# Patient Record
Sex: MALE | Race: WHITE | NOT HISPANIC OR LATINO | Employment: OTHER | URBAN - METROPOLITAN AREA
[De-identification: names, ages, dates, MRNs, and addresses within clinical notes are randomized per-mention and may not be internally consistent; named-entity substitution may affect disease eponyms.]

---

## 2017-05-17 RX ORDER — TAMSULOSIN HYDROCHLORIDE 0.4 MG/1
CAPSULE ORAL
Qty: 90 CAPSULE | Refills: 1 | Status: SHIPPED | OUTPATIENT
Start: 2017-05-17 | End: 2017-06-23 | Stop reason: SDUPTHER

## 2017-06-06 RX ORDER — OXYBUTYNIN CHLORIDE 15 MG/1
TABLET, EXTENDED RELEASE ORAL
Qty: 30 TABLET | Refills: 11 | Status: SHIPPED | OUTPATIENT
Start: 2017-06-06 | End: 2017-06-23 | Stop reason: SDUPTHER

## 2017-06-06 RX ORDER — FINASTERIDE 5 MG/1
TABLET, FILM COATED ORAL
Qty: 90 TABLET | Refills: 1 | Status: SHIPPED | OUTPATIENT
Start: 2017-06-06 | End: 2017-06-23 | Stop reason: SDUPTHER

## 2017-06-23 ENCOUNTER — OFFICE VISIT (OUTPATIENT)
Dept: UROLOGY | Facility: CLINIC | Age: 67
End: 2017-06-23
Attending: UROLOGY
Payer: MEDICARE

## 2017-06-23 VITALS
DIASTOLIC BLOOD PRESSURE: 68 MMHG | WEIGHT: 256.5 LBS | HEIGHT: 72 IN | HEART RATE: 62 BPM | SYSTOLIC BLOOD PRESSURE: 125 MMHG | BODY MASS INDEX: 34.74 KG/M2

## 2017-06-23 DIAGNOSIS — N39.41 URGENCY INCONTINENCE: ICD-10-CM

## 2017-06-23 DIAGNOSIS — C64.9 RENAL CELL CARCINOMA, UNSPECIFIED LATERALITY: ICD-10-CM

## 2017-06-23 DIAGNOSIS — N40.0 BENIGN PROSTATIC HYPERPLASIA, PRESENCE OF LOWER URINARY TRACT SYMPTOMS UNSPECIFIED, UNSPECIFIED MORPHOLOGY: Primary | ICD-10-CM

## 2017-06-23 DIAGNOSIS — N40.0 ENLARGED PROSTATE: ICD-10-CM

## 2017-06-23 LAB — POC RESIDUAL URINE VOLUME: 59 ML (ref 0–100)

## 2017-06-23 PROCEDURE — 99214 OFFICE O/P EST MOD 30 MIN: CPT | Mod: S$PBB,,, | Performed by: UROLOGY

## 2017-06-23 PROCEDURE — 99213 OFFICE O/P EST LOW 20 MIN: CPT | Mod: PBBFAC | Performed by: UROLOGY

## 2017-06-23 PROCEDURE — 99999 PR PBB SHADOW E&M-EST. PATIENT-LVL III: CPT | Mod: PBBFAC,,, | Performed by: UROLOGY

## 2017-06-23 PROCEDURE — 51798 US URINE CAPACITY MEASURE: CPT | Mod: PBBFAC | Performed by: UROLOGY

## 2017-06-23 PROCEDURE — 1159F MED LIST DOCD IN RCRD: CPT | Mod: ,,, | Performed by: UROLOGY

## 2017-06-23 RX ORDER — TAMSULOSIN HYDROCHLORIDE 0.4 MG/1
1 CAPSULE ORAL DAILY
Qty: 90 CAPSULE | Refills: 3 | Status: SHIPPED | OUTPATIENT
Start: 2017-06-23 | End: 2017-11-25 | Stop reason: SDUPTHER

## 2017-06-23 RX ORDER — ERGOCALCIFEROL 1.25 MG/1
50000 CAPSULE ORAL
COMMUNITY

## 2017-06-23 RX ORDER — DIGOXIN 125 MCG
TABLET ORAL
COMMUNITY
Start: 2017-04-09

## 2017-06-23 RX ORDER — ALLOPURINOL 100 MG/1
TABLET ORAL
COMMUNITY
Start: 2017-05-20 | End: 2020-10-16 | Stop reason: SDUPTHER

## 2017-06-23 RX ORDER — PREDNISONE 10 MG/1
TABLET ORAL
COMMUNITY
Start: 2017-06-21 | End: 2018-06-05

## 2017-06-23 RX ORDER — SOTALOL HYDROCHLORIDE 80 MG/1
TABLET ORAL
COMMUNITY
Start: 2017-04-09

## 2017-06-23 RX ORDER — OXYBUTYNIN CHLORIDE 15 MG/1
15 TABLET, EXTENDED RELEASE ORAL DAILY
Qty: 90 TABLET | Refills: 3 | Status: SHIPPED | OUTPATIENT
Start: 2017-06-23 | End: 2018-06-05 | Stop reason: SDUPTHER

## 2017-06-23 RX ORDER — FINASTERIDE 5 MG/1
5 TABLET, FILM COATED ORAL DAILY
Qty: 90 TABLET | Refills: 11 | Status: SHIPPED | OUTPATIENT
Start: 2017-06-23 | End: 2017-12-11 | Stop reason: SDUPTHER

## 2017-06-23 RX ORDER — AMOXICILLIN AND CLAVULANATE POTASSIUM 875; 125 MG/1; MG/1
TABLET, FILM COATED ORAL
COMMUNITY
Start: 2017-06-13 | End: 2017-06-23

## 2017-06-23 NOTE — PROGRESS NOTES
Subjective:      Amish Cruz is a 66 y.o. male who returns today regarding his   Enlarged prostate and urge urinary incontinence    His symptoms are somewhat controlled with Flomax Proscar and Ditropan.  No side effects with medications reported.  He does have significant nocturia.    The following portions of the patient's history were reviewed and updated as appropriate: allergies, current medications, past family history, past medical history, past social history, past surgical history and problem list.    Review of Systems  Pertinent items are noted in HPI.  A comprehensive multipoint review of systems was negative except as otherwise stated in the HPI.     Objective:   Vitals: /68 (BP Location: Right arm, Patient Position: Sitting, BP Method: Automatic)   Pulse 62   Ht 6' (1.829 m)   Wt 116.3 kg (256 lb 8.1 oz)   BMI 34.79 kg/m²     Physical Exam   General: alert and oriented, no acute distress  Respiratory: Symmetric expansion, non-labored breathing  Cardiovascular: no peripheral edema  Abdomen: soft, non distended  Skin: normal coloration and turgor, no rashes, no suspicious skin lesions noted  Neuro: Lee gait and tremor consistent with Parkinson's  Psych: normal judgment and insight, normal mood/affect and non-anxious  Prostate 35-40 g no discrete nodules    Physical Exam    Lab Review   Urinalysis demonstrates negative for all components  No results found for: WBC, HGB, HCT, MCV, PLT  No results found for: CREATININE, BUN      PSA 5.17    Imaging  Post void residual 59 cc  Assessment and Plan:   Benign prostatic hyperplasia, presence of lower urinary tract symptoms unspecified, unspecified morphology  -     POCT URINE DIPSTICK WITH MICROSCOPE, AUTOMATED  -     POCT Bladder Scan    Renal cell carcinoma, unspecified laterality    Enlarged prostate    Urgency incontinence    Other orders  -     finasteride (PROSCAR) 5 mg tablet; Take 1 tablet (5 mg total) by mouth once daily.  Dispense: 90  tablet; Refill: 11  -     oxybutynin (DITROPAN XL) 15 MG TR24; Take 1 tablet (15 mg total) by mouth once daily.  Dispense: 90 tablet; Refill: 3  -     tamsulosin (FLOMAX) 0.4 mg Cp24; Take 1 capsule (0.4 mg total) by mouth once daily.  Dispense: 90 capsule; Refill: 3      We discussed his PSA level and age specific PSA ranges.  We discussed the risk and benefits of biopsy.  Given his comorbidities, I do not think prostate biopsy is necessary.  I would follow him with serial examinations.  Would consider not checking PSA in the future.  Return to clinic 1 year for repeat prostate examination and PVR check.  Follow-up with his primary physician Dr. Dallas

## 2017-08-23 DIAGNOSIS — G20.A1 PD (PARKINSON'S DISEASE): ICD-10-CM

## 2017-08-23 RX ORDER — CARBIDOPA, LEVODOPA AND ENTACAPONE 50; 200; 200 MG/1; MG/1; MG/1
1 TABLET, FILM COATED ORAL 4 TIMES DAILY
Qty: 360 TABLET | Refills: 3 | Status: SHIPPED | OUTPATIENT
Start: 2017-08-23 | End: 2018-09-17 | Stop reason: SDUPTHER

## 2017-08-23 NOTE — TELEPHONE ENCOUNTER
----- Message from Edgar Swan sent at 8/23/2017  1:12 PM CDT -----  Contact: Self @ [phone]  Pt would like refill for carbidopa-levodopa-entacapone (STALEVO) -200 mg per tablet    [facility] - [place] - [address]  [address] [place] [address]  Phone: [phone] Fax: [phone]

## 2017-11-27 RX ORDER — TAMSULOSIN HYDROCHLORIDE 0.4 MG/1
CAPSULE ORAL
Qty: 90 CAPSULE | Refills: 3 | Status: SHIPPED | OUTPATIENT
Start: 2017-11-27 | End: 2018-06-05 | Stop reason: SDUPTHER

## 2017-12-11 RX ORDER — FINASTERIDE 5 MG/1
TABLET, FILM COATED ORAL
Qty: 90 TABLET | Refills: 1 | Status: SHIPPED | OUTPATIENT
Start: 2017-12-11 | End: 2018-06-05 | Stop reason: SDUPTHER

## 2018-05-16 ENCOUNTER — TELEPHONE (OUTPATIENT)
Dept: UROLOGY | Facility: CLINIC | Age: 68
End: 2018-05-16

## 2018-05-16 NOTE — TELEPHONE ENCOUNTER
LM for pt that first available appt with Dr Rene is 6/5.  Appt made and appt slip mailed to patient

## 2018-05-16 NOTE — TELEPHONE ENCOUNTER
----- Message from Joy Gómez sent at 5/16/2018  1:15 PM CDT -----  Contact: CAROLYN MI [[id number]]            Name of Who is Calling: CAROLYN MI [[id number]]      What is the request in detail: Pt is calling to find out if he can be seen sooner than June due to increased PSA levels.  Pt says his PCP noticed the increase after having labs drawn about two weeks ago.  Please contact pt to further and advise.      Can the clinic reply by [facility]: No      What Number to Call Back [facility]: [phone]

## 2018-06-05 ENCOUNTER — OFFICE VISIT (OUTPATIENT)
Dept: UROLOGY | Facility: CLINIC | Age: 68
End: 2018-06-05
Attending: UROLOGY
Payer: MEDICARE

## 2018-06-05 VITALS
DIASTOLIC BLOOD PRESSURE: 75 MMHG | HEIGHT: 72 IN | WEIGHT: 256 LBS | BODY MASS INDEX: 34.67 KG/M2 | SYSTOLIC BLOOD PRESSURE: 112 MMHG | HEART RATE: 62 BPM

## 2018-06-05 DIAGNOSIS — N39.41 URGENCY INCONTINENCE: ICD-10-CM

## 2018-06-05 DIAGNOSIS — N40.0 ENLARGED PROSTATE: Primary | ICD-10-CM

## 2018-06-05 PROCEDURE — 99214 OFFICE O/P EST MOD 30 MIN: CPT | Mod: S$GLB,,, | Performed by: UROLOGY

## 2018-06-05 RX ORDER — AMLODIPINE BESYLATE 2.5 MG/1
TABLET ORAL
COMMUNITY
Start: 2018-06-04

## 2018-06-05 RX ORDER — OXYBUTYNIN CHLORIDE 15 MG/1
15 TABLET, EXTENDED RELEASE ORAL DAILY
Qty: 90 TABLET | Refills: 3 | Status: SHIPPED | OUTPATIENT
Start: 2018-06-05 | End: 2019-09-05 | Stop reason: SDUPTHER

## 2018-06-05 RX ORDER — DOCUSATE SODIUM 100 MG/1
100 CAPSULE, LIQUID FILLED ORAL 2 TIMES DAILY
COMMUNITY

## 2018-06-05 RX ORDER — FINASTERIDE 5 MG/1
5 TABLET, FILM COATED ORAL DAILY
Qty: 90 TABLET | Refills: 3 | Status: SHIPPED | OUTPATIENT
Start: 2018-06-05 | End: 2019-09-05 | Stop reason: SDUPTHER

## 2018-06-05 RX ORDER — POTASSIUM CHLORIDE 20 MEQ/1
TABLET, EXTENDED RELEASE ORAL
COMMUNITY
Start: 2018-05-17 | End: 2020-10-16 | Stop reason: SDUPTHER

## 2018-06-05 RX ORDER — BUMETANIDE 1 MG/1
TABLET ORAL
COMMUNITY
Start: 2018-05-17

## 2018-06-05 RX ORDER — TAMSULOSIN HYDROCHLORIDE 0.4 MG/1
1 CAPSULE ORAL DAILY
Qty: 90 CAPSULE | Refills: 3 | Status: SHIPPED | OUTPATIENT
Start: 2018-06-05 | End: 2019-09-05 | Stop reason: SDUPTHER

## 2018-09-17 DIAGNOSIS — G20.A1 PD (PARKINSON'S DISEASE): ICD-10-CM

## 2018-09-18 RX ORDER — CARBIDOPA, LEVODOPA AND ENTACAPONE 50; 200; 200 MG/1; MG/1; MG/1
TABLET, FILM COATED ORAL
Qty: 360 TABLET | Refills: 3 | Status: SHIPPED | OUTPATIENT
Start: 2018-09-18 | End: 2019-04-11 | Stop reason: SDUPTHER

## 2019-04-05 ENCOUNTER — OFFICE VISIT (OUTPATIENT)
Dept: NEUROLOGY | Facility: CLINIC | Age: 69
End: 2019-04-05
Payer: MEDICARE

## 2019-04-05 VITALS
HEIGHT: 73 IN | HEART RATE: 61 BPM | BODY MASS INDEX: 33.78 KG/M2 | SYSTOLIC BLOOD PRESSURE: 127 MMHG | DIASTOLIC BLOOD PRESSURE: 78 MMHG

## 2019-04-05 DIAGNOSIS — I48.20 CHRONIC ATRIAL FIBRILLATION: ICD-10-CM

## 2019-04-05 DIAGNOSIS — R26.89 PRIMARY FREEZING OF GAIT: ICD-10-CM

## 2019-04-05 DIAGNOSIS — G20.A1 PD (PARKINSON'S DISEASE): Primary | ICD-10-CM

## 2019-04-05 PROCEDURE — 99204 PR OFFICE/OUTPT VISIT, NEW, LEVL IV, 45-59 MIN: ICD-10-PCS | Mod: S$GLB,,, | Performed by: PSYCHIATRY & NEUROLOGY

## 2019-04-05 PROCEDURE — 99999 PR PBB SHADOW E&M-EST. PATIENT-LVL III: ICD-10-PCS | Mod: PBBFAC,,, | Performed by: PSYCHIATRY & NEUROLOGY

## 2019-04-05 PROCEDURE — 99204 OFFICE O/P NEW MOD 45 MIN: CPT | Mod: S$GLB,,, | Performed by: PSYCHIATRY & NEUROLOGY

## 2019-04-05 PROCEDURE — 99999 PR PBB SHADOW E&M-EST. PATIENT-LVL III: CPT | Mod: PBBFAC,,, | Performed by: PSYCHIATRY & NEUROLOGY

## 2019-04-05 NOTE — PROGRESS NOTES
"I. Chief Complaints during this visit:   Follow up for Parkinson's disease     History of present illness:   68 y.o. M seen in routine f/u for PD.  Not seen in four years.  Checked in 20min late.  Accompanied by daughter.  Says he had basically been ok, had trouble getting appointment.    Pain medication for muscle spasm caused significant problems of hallucinations.  Daughter says he was also taking more PD meds because family was more involved as well.  So they cut back on PD meds and his hallucinations improved, but they have had increase back up in last few months because he was freezing often.  No recurrence of hallucinations.    Has been living with daughter for past 5 years.  Can do all his own ADLs.  Occasionally confused at night.  Recent home health.      Interval history 11/3/15:  Accompanied by daughter.  Increase in stalevo helped his gait, but not his FOG and perhaps made him more tired.    From my note 8/3/15:  Now his 3 yo grand-daughter is living with them, though this has been a good thing for them.  Walking is "lowsy".  Freezing often.  No falls.  The legs are swelling up, again, and PCP hesitant to give diuretic because of low bp.    From my note 14:  Is more cognizant of wearing off or late with medications.  Freezing of legs at time and cannot step back.    Feelings of near-faint has much improved.  Wife  6 weeks ago after an elective surgery.      From my note 13:  3-4 months ago had change in behavior and doing strange things like walking out of house only in diaper.  During PSG, 02 dropped to 84% and became agitated.  Since adjusting the CPAP settings (up from 10 to 15), he no longer has the odd behavior.  Dozes off during day and will awake confused, but nothing like he was 3-4 months ago.  Patient says he recalls the episode with the diaper- that he woke up with a start out of sleep, ran out of the house and when he was outside, wondered "what the heck" he was out there " "for.  In terms of PD, he feels he has progressed.  Fallen x 2, putting hole in sheetrock.  More difficulty walking, drooling increasing, urinary urgency x 6 months.  Low back pain for years, no difference in this.    II. Review of systems As in HPI, otherwise, balance 3 systems reviewed and are negative.   III. Past Medical history: PD ~2004, HTN, obesity, arthritis   Family history: Father with PD, paternal aunt with "essential tremor"   Social history: No tob or etoh, , disabled; two daughters.      Current medications: stalevo 200 (6/12/10pm)   Allergies: Demerol, azilect (presyncope), mirapex (edema, weight)     IV. Physical Exam (focused)      Vitals:    04/05/19 1025   BP: 127/78   Pulse: 61   Height: 6' 1" (1.854 m)     General appearance: Well nourished, well developed, no acute distress.   ++head drop      Cardiovascular:  pedal pulses 2, no edema or cyanosis, heart regular rate and rhythym, no carotid bruits.         -------------------------------------------------------------  Facial Expression: normal       Affect: full       Orientation to time & place:  Oriented to time, place, person and situation       Attention & concentration:  Normal attention span and concentration       Memory:  Not tested, but gave all his own history  Language: Spontaneous, fluent; able to repeat and name objects        Fund of knowledge:  Aware of current events        Speech:  normal (not dysarthric)  -------------------------------------------------------  Cranial nerves: normal visual acuity, visual fields full, optic discs not well visualized due small pupils, pupils equal round and reactive, extraocular movements intact,       facial sensation intact, face symmetrical, hearing intact to whisper, palate raises midline, shoulder shrug strength normal, tongue protrudes midline.        -------------------------------------------------------  Musculoskeletal  Muscle tone: moderate tone BLE, mild RUE       Muscle Bulk: " all 4 extremities normal        Muscle strength:  5/5 in all 4 extremities        No pronator drift  Sensation: Intact to light touch in all extremities          --------------------------------------------------------------  Cerebellar and Coordination  Gait:  Narrow, 3: Moderate: Requires an assistance device for safe walking (walking stick, walker) but not a person. +freezing of right foot       Finger-nose: no dysmetria       Rapid Alternating Movements (pronation/supination):  R normal; L normal  --------------------------------------------------------------  MOVEMENT DISORDERS FOCUSED EXAM  Abnormality of movement (bradykinesia, hyperkinesia) present? Yes , 3: Moderate: Moderate global slowness and poverty of spontaneous movements.   Tremor present?   No   Posture:  3: Moderate: Stooped posture, scoliosis or leaning to one side that cannot be corrected  volitionally to a normal posture by the patient.   Postural stability:  no Rhomberg        V. Laboratory/ Radiological Data: No new disease-specific data     VI. Medical Decision Making     Problem List Items Addressed This Visit        1 - High    PD (Parkinson's disease) - Primary    Overview     2004         Current Assessment & Plan     PD x 15 years with freezing of gait, right tremor, but doing fairly well (still independent with ADLS) on current dosing.  Seems he may have hallucinated with more levodopa (not unexpected), but no VH now.    We discussed DBS last visit.  He is DBS candidate by age, response to meds and symptoms. Indication is frequency of medications, but due to loss of wife during an elective surgery, he and daughter have been opposed.  Not discussed this visit.   -> no changes to medications at this time   -> f/u 3-4 months to maintain contact!           Primary freezing of gait       4     Chronic atrial fibrillation          Follow up: 4 months

## 2019-04-05 NOTE — ASSESSMENT & PLAN NOTE
PD x 15 years with freezing of gait, right tremor, but doing fairly well (still independent with ADLS) on current dosing.  Seems he may have hallucinated with more levodopa (not unexpected), but no VH now.    We discussed DBS last visit.  He is DBS candidate by age, response to meds and symptoms. Indication is frequency of medications, but due to loss of wife during an elective surgery, he and daughter have been opposed.  Not discussed this visit.   -> no changes to medications at this time   -> f/u 3-4 months to maintain contact!

## 2019-04-11 DIAGNOSIS — G20.A1 PD (PARKINSON'S DISEASE): ICD-10-CM

## 2019-04-12 RX ORDER — CARBIDOPA, LEVODOPA AND ENTACAPONE 50; 200; 200 MG/1; MG/1; MG/1
TABLET, FILM COATED ORAL
Qty: 120 TABLET | Refills: 3 | Status: SHIPPED | OUTPATIENT
Start: 2019-04-12 | End: 2019-12-27

## 2019-08-09 ENCOUNTER — TELEPHONE (OUTPATIENT)
Dept: UROLOGY | Facility: CLINIC | Age: 69
End: 2019-08-09

## 2019-08-09 NOTE — TELEPHONE ENCOUNTER
----- Message from Sally Candelario sent at 8/9/2019  3:17 PM CDT -----  Contact: CAROLYN MI [[id number]]   Name of Who is Calling: CAROLYN MI [[id number]]       What is the request in detail: Patient is requesting a call from staff in regards to scheduling an appointment he states his PSA levels are high and needs to be seen ..... Please contact to further discuss and advise.     Can the clinic reply by [facility]: no     What Number to Call Back if not in [facility]: [phone]

## 2019-08-27 ENCOUNTER — OFFICE VISIT (OUTPATIENT)
Dept: NEUROLOGY | Facility: CLINIC | Age: 69
End: 2019-08-27
Payer: MEDICARE

## 2019-08-27 VITALS
BODY MASS INDEX: 33.78 KG/M2 | HEIGHT: 73 IN | DIASTOLIC BLOOD PRESSURE: 70 MMHG | HEART RATE: 55 BPM | SYSTOLIC BLOOD PRESSURE: 109 MMHG

## 2019-08-27 DIAGNOSIS — G20.A1 PD (PARKINSON'S DISEASE): ICD-10-CM

## 2019-08-27 PROCEDURE — 99214 PR OFFICE/OUTPT VISIT, EST, LEVL IV, 30-39 MIN: ICD-10-PCS | Mod: S$PBB,,, | Performed by: PSYCHIATRY & NEUROLOGY

## 2019-08-27 PROCEDURE — 99999 PR PBB SHADOW E&M-EST. PATIENT-LVL III: ICD-10-PCS | Mod: PBBFAC,,, | Performed by: PSYCHIATRY & NEUROLOGY

## 2019-08-27 PROCEDURE — 99214 OFFICE O/P EST MOD 30 MIN: CPT | Mod: S$PBB,,, | Performed by: PSYCHIATRY & NEUROLOGY

## 2019-08-27 PROCEDURE — 99213 OFFICE O/P EST LOW 20 MIN: CPT | Mod: PBBFAC | Performed by: PSYCHIATRY & NEUROLOGY

## 2019-08-27 PROCEDURE — 99999 PR PBB SHADOW E&M-EST. PATIENT-LVL III: CPT | Mod: PBBFAC,,, | Performed by: PSYCHIATRY & NEUROLOGY

## 2019-08-27 NOTE — ASSESSMENT & PLAN NOTE
15 years of Parkinson's Disease, still ambulatory.  Physically appears under-medicated, but does not tolerate higher stalevo.   -> no changes to meds    -> given info on siallorhea

## 2019-08-27 NOTE — PROGRESS NOTES
"I. Chief Complaints during this visit:   Follow up for Parkinson's disease     History of present illness:   68 y.o. M seen in routine f/u for PD.  Accompanied by daughter.    Has been doing well.  Walking in house- just uses wheelchair outside home.    If he takes too much stalevo, he gets cramping in legs, hallucinations.  In general, he seems to do best on twice a day stalevo.    Drooling, but says it's not a big concern for him.    19  Not seen in four years.  Checked in 20min late.  Accompanied by daughter.  Says he had basically been ok, had trouble getting appointment.  Pain medication for muscle spasm caused significant problems of hallucinations.  Daughter says he was also taking more PD meds because family was more involved as well.  So they cut back on PD meds and his hallucinations improved, but they have had increase back up in last few months because he was freezing often.  No recurrence of hallucinations.  Has been living with daughter for past 5 years.  Can do all his own ADLs.  Occasionally confused at night.  Recent home health.    Interval history 11/3/15:  Accompanied by daughter.  Increase in stalevo helped his gait, but not his FOG and perhaps made him more tired.    From my note 8/3/15:  Now his 3 yo grand-daughter is living with them, though this has been a good thing for them.  Walking is "lowsy".  Freezing often.  No falls.  The legs are swelling up, again, and PCP hesitant to give diuretic because of low bp.    From my note 14:  Is more cognizant of wearing off or late with medications.  Freezing of legs at time and cannot step back.    Feelings of near-faint has much improved.  Wife  6 weeks ago after an elective surgery.      From my note 13:  3-4 months ago had change in behavior and doing strange things like walking out of house only in diaper.  During PSG, 02 dropped to 84% and became agitated.  Since adjusting the CPAP settings (up from 10 to 15), he no longer has " "the odd behavior.  Dozes off during day and will awake confused, but nothing like he was 3-4 months ago.  Patient says he recalls the episode with the diaper- that he woke up with a start out of sleep, ran out of the house and when he was outside, wondered "what the heck" he was out there for.  In terms of PD, he feels he has progressed.  Fallen x 2, putting hole in sheetrock.  More difficulty walking, drooling increasing, urinary urgency x 6 months.  Low back pain for years, no difference in this.    II. Review of systems As in HPI, otherwise, balance 3 systems reviewed and are negative.   III. Past Medical history: PD ~2004, HTN, obesity, arthritis   Family history: Father with PD, paternal aunt with "essential tremor"   Social history: No tob or etoh, , disabled; two daughters.      Current medications: stalevo 200 BID   Allergies: Demerol, azilect (presyncope), mirapex (edema, weight)     IV. Physical Exam (focused)      Vitals:    08/27/19 1053   BP: 109/70   Pulse: (!) 55   Height: 6' 1" (1.854 m)     General appearance: Well nourished, well developed, no acute distress.   +head drop      Cardiovascular:  pedal pulses 2, no edema or cyanosis, heart regular rate and rhythym, no carotid bruits.         -------------------------------------------------------------  Facial Expression: normal       Affect: full       Orientation to time & place:  Oriented to time, place, person and situation       Attention & concentration:  Normal attention span and concentration       Memory:  Not tested, but gave all his own history  Language: Spontaneous, fluent; able to repeat and name objects        Fund of knowledge:  Aware of current events        Speech:  normal (not dysarthric)  -------------------------------------------------------  Cranial nerves: normal visual acuity, visual fields full, optic discs not well visualized due small pupils, pupils equal round and reactive, extraocular movements intact,       facial " sensation intact, face symmetrical, hearing intact to whisper, palate raises midline, shoulder shrug strength normal, tongue protrudes midline.        -------------------------------------------------------  Musculoskeletal  Muscle tone: moderate tone BLE, mild RUE       Muscle Bulk: all 4 extremities normal        Muscle strength:  5/5 in all 4 extremities        No pronator drift  Sensation: Intact to light touch in all extremities          --------------------------------------------------------------  Cerebellar and Coordination  Gait:  Narrow, 3: Moderate: Requires an assistance device for safe walking (walking stick, walker) but not a person. +freezing of right foot       Finger-nose: no dysmetria       Rapid Alternating Movements (pronation/supination):  R normal; L normal  --------------------------------------------------------------  MOVEMENT DISORDERS FOCUSED EXAM  Abnormality of movement (bradykinesia, hyperkinesia) present? Yes , 3: Moderate: Moderate global slowness and poverty of spontaneous movements.   Tremor present?   No   Posture:  3: Moderate: Stooped posture, scoliosis or leaning to one side that cannot be corrected  volitionally to a normal posture by the patient.   Postural stability:  no Rhomberg        V. Laboratory/ Radiological Data: No new disease-specific data     VI. Medical Decision Making     Problem List Items Addressed This Visit        1 - High    PD (Parkinson's disease)    Overview     2004         Current Assessment & Plan     15 years of Parkinson's Disease, still ambulatory.  Physically appears under-medicated, but does not tolerate higher stalevo.   -> no changes to meds    -> given info on siallorhea               Follow up: 4 months

## 2019-08-27 NOTE — PATIENT INSTRUCTIONS
If you have Parkinson's disease and you drool, you are not alone. Surveys show that drooling can affect up to 78 percent of people with Parkinson's.  It's not entirely clear why Parkinson's disease can cause you to drool, but the condition can affect your ability to swallow, making drool more likely. Researchers studying this issue, have found that people with Parkinson's don't produce any more saliva than other people. In fact, Parkinson's may cause you to produce less saliva. Chances are you just don't swallow your saliva as much, most likely because you have difficulty swallowing.  In addition to causing embarrassment, drooling can cause sores at the corners of your mouth and may give you bad breath. You can also accidentally breathe in a large amount of excess saliva, which may lead to pneumonia.  Treating Excessive Drooling   There are several drug treatments that can address the problem of excess saliva and drool.  Your doctor may prescribe you potent drugs known as anticholinergics, such as Artane (trihexyphenidyl hydrochloride) and Cogentin (benztropine mesylate) in an attempt to dry up any excess saliva you may have. Unfortunately, this class of drugs often causes side effects, including constipation, urinary retention, memory impairment, confusion, and even hallucinations, particularly in elderly individuals. While they can help control drooling, they aren't always effective.    Another way you can prevent drool, as strange as it sounds, is to use prescription eye drops under your tongue. You'll want to use what the eye doctors use to dilate your pupils before an eye exam: 1 percent atropine ophthalmic solution, which you'll need a prescription to obtain. Once you have the drops, you would place several drops under your tongue twice daily, allowing the active drug - atropine - to slow your saliva production.  Side-effects include confusion and hallucinations, if too much of the atropine gets into your  system.    Botox for Excessive Drooling   Injections of Botox - botulinum toxin A - directly into your salivary glands have also been used to try to stop excessive saliva in Parkinson's disease. Although botox works for many people, the treatment carries a risk of side effects including too-dry of a mouth. Botox injections in this area can also make it difficult to swallow food.  The effects of the Botox only last for about three to four months, after which the procedure would need to be repeated. If you're interested in trying this, make sure to look for a doctor experienced in this specific procedure, as serious side effects - weakening your neck muscles so that you have problems swallowing - are possible. Neurologists, pain management doctors, and physiatrists are healthcare providers with experience using botox for neurological causes. Most dermatologists use botox in their practices as well, but it is often more for cosmetic reasons.

## 2019-09-05 ENCOUNTER — OFFICE VISIT (OUTPATIENT)
Dept: UROLOGY | Facility: CLINIC | Age: 69
End: 2019-09-05
Payer: MEDICARE

## 2019-09-05 VITALS
BODY MASS INDEX: 31.14 KG/M2 | DIASTOLIC BLOOD PRESSURE: 73 MMHG | HEART RATE: 57 BPM | WEIGHT: 235 LBS | HEIGHT: 73 IN | SYSTOLIC BLOOD PRESSURE: 117 MMHG

## 2019-09-05 DIAGNOSIS — N40.0 ENLARGED PROSTATE: Primary | ICD-10-CM

## 2019-09-05 DIAGNOSIS — N39.41 URGENCY INCONTINENCE: ICD-10-CM

## 2019-09-05 LAB
BILIRUB SERPL-MCNC: NORMAL MG/DL
BLOOD URINE, POC: NORMAL
COLOR, POC UA: NORMAL
GLUCOSE UR QL STRIP: NORMAL
KETONES UR QL STRIP: NORMAL
LEUKOCYTE ESTERASE URINE, POC: NORMAL
NITRITE, POC UA: NORMAL
PH, POC UA: 5
POC RESIDUAL URINE VOLUME: 74 ML (ref 0–100)
PROTEIN, POC: NORMAL
SPECIFIC GRAVITY, POC UA: 1.02
UROBILINOGEN, POC UA: NORMAL

## 2019-09-05 PROCEDURE — 81002 URINALYSIS NONAUTO W/O SCOPE: CPT | Mod: S$GLB,,, | Performed by: UROLOGY

## 2019-09-05 PROCEDURE — 51798 POCT BLADDER SCAN: ICD-10-PCS | Mod: S$GLB,,, | Performed by: UROLOGY

## 2019-09-05 PROCEDURE — 99214 OFFICE O/P EST MOD 30 MIN: CPT | Mod: 25,S$GLB,, | Performed by: UROLOGY

## 2019-09-05 PROCEDURE — 99214 PR OFFICE/OUTPT VISIT, EST, LEVL IV, 30-39 MIN: ICD-10-PCS | Mod: 25,S$GLB,, | Performed by: UROLOGY

## 2019-09-05 PROCEDURE — 51798 US URINE CAPACITY MEASURE: CPT | Mod: S$GLB,,, | Performed by: UROLOGY

## 2019-09-05 PROCEDURE — 81002 POCT URINE DIPSTICK WITHOUT MICROSCOPE: ICD-10-PCS | Mod: S$GLB,,, | Performed by: UROLOGY

## 2019-09-05 RX ORDER — FINASTERIDE 5 MG/1
5 TABLET, FILM COATED ORAL DAILY
Qty: 90 TABLET | Refills: 3 | Status: SHIPPED | OUTPATIENT
Start: 2019-09-05

## 2019-09-05 RX ORDER — ALLOPURINOL 300 MG/1
TABLET ORAL
Refills: 0 | COMMUNITY
Start: 2019-08-10

## 2019-09-05 RX ORDER — OXYBUTYNIN CHLORIDE 15 MG/1
15 TABLET, EXTENDED RELEASE ORAL DAILY
Qty: 90 TABLET | Refills: 3 | Status: SHIPPED | OUTPATIENT
Start: 2019-09-05 | End: 2020-10-13 | Stop reason: SDUPTHER

## 2019-09-05 RX ORDER — TAMSULOSIN HYDROCHLORIDE 0.4 MG/1
1 CAPSULE ORAL DAILY
Qty: 90 CAPSULE | Refills: 3 | Status: SHIPPED | OUTPATIENT
Start: 2019-09-05 | End: 2020-10-13 | Stop reason: SDUPTHER

## 2019-09-05 NOTE — PROGRESS NOTES
"Subjective:      Amish Cruz is a 68 y.o. male who returns today regarding his     Enlarged practice state and overactive bladder, possible neurogenic bladder. Current medication regimen controls his symptoms well.  On Flomax Proscar and Ditropan XL.    The following portions of the patient's history were reviewed and updated as appropriate: allergies, current medications, past family history, past medical history, past social history, past surgical history and problem list.    Review of Systems  Pertinent items are noted in HPI.  A comprehensive multipoint review of systems was negative except as otherwise stated in the HPI.     Objective:   Vitals: /73   Pulse (!) 57   Ht 6' 1" (1.854 m)   Wt 106.6 kg (235 lb)   BMI 31.00 kg/m²     Physical Exam   General: alert and oriented, no acute distress  Respiratory: Symmetric expansion, non-labored breathing  Cardiovascular: normal to inspection  Abdomen: non distended   Skin: normal coloration and turgor, no rashes, no suspicious skin lesions noted  Neuro: no gross deficits  Psych: normal judgment and insight, normal mood/affect and non-anxious  Unable to transfer for prostate exam    Physical Exam    Lab Review   Urinalysis demonstrates microscopic urinalysis negative.  Dipstick shows trace leukocytes trace protein otherwise negative    No results found for: WBC, HGB, HCT, MCV, PLT  No results found for: CREATININE, BUN    Imaging  Postvoid residual 74 cc    Assessment and Plan:   Enlarged prostate  -     POCT URINE DIPSTICK WITHOUT MICROSCOPE  -     POCT Bladder Scan    Urgency incontinence  -     POCT URINE DIPSTICK WITHOUT MICROSCOPE  -     POCT Bladder Scan    Other orders  -     tamsulosin (FLOMAX) 0.4 mg Cap; Take 1 capsule (0.4 mg total) by mouth once daily.  Dispense: 90 capsule; Refill: 3  -     oxybutynin (DITROPAN XL) 15 MG TR24; Take 1 tablet (15 mg total) by mouth once daily.  Dispense: 90 tablet; Refill: 3  -     finasteride (PROSCAR) 5 mg " tablet; Take 1 tablet (5 mg total) by mouth once daily.  Dispense: 90 tablet; Refill: 3     Return to clinic 1 year for repeat urinalysis and postvoid residual.  We have chosen to stop checking PSA due to his age and comorbidities

## 2019-12-26 DIAGNOSIS — G20.A1 PD (PARKINSON'S DISEASE): ICD-10-CM

## 2019-12-27 RX ORDER — CARBIDOPA, LEVODOPA AND ENTACAPONE 50; 200; 200 MG/1; MG/1; MG/1
TABLET, FILM COATED ORAL
Qty: 120 TABLET | Refills: 0 | Status: SHIPPED | OUTPATIENT
Start: 2019-12-27 | End: 2020-01-06 | Stop reason: SDUPTHER

## 2019-12-27 NOTE — TELEPHONE ENCOUNTER
----- Message from [place] sent at 12/27/2019 12:15 PM CST -----  Contact: pt   Please call pt at [phone]    Refill request for carbidopa-levodopa-entacapone (STALEVO) -200 mg per tablet    Use [facility] ([place] - [place], [place] - [address]    Patient only has a 2 day supply left    Thank you

## 2020-01-06 ENCOUNTER — OFFICE VISIT (OUTPATIENT)
Dept: NEUROLOGY | Facility: CLINIC | Age: 70
End: 2020-01-06
Payer: MEDICARE

## 2020-01-06 VITALS
HEIGHT: 73 IN | BODY MASS INDEX: 31 KG/M2 | DIASTOLIC BLOOD PRESSURE: 72 MMHG | SYSTOLIC BLOOD PRESSURE: 112 MMHG | HEART RATE: 60 BPM

## 2020-01-06 DIAGNOSIS — K11.7 SIALORRHEA: Primary | ICD-10-CM

## 2020-01-06 DIAGNOSIS — G20.A1 PD (PARKINSON'S DISEASE): ICD-10-CM

## 2020-01-06 DIAGNOSIS — K11.7 DROOLING: ICD-10-CM

## 2020-01-06 PROCEDURE — 64611 CHEMODENERV SALIV GLANDS: CPT | Mod: S$PBB,,, | Performed by: PSYCHIATRY & NEUROLOGY

## 2020-01-06 PROCEDURE — 99999 PR PBB SHADOW E&M-EST. PATIENT-LVL III: ICD-10-PCS | Mod: PBBFAC,,, | Performed by: PSYCHIATRY & NEUROLOGY

## 2020-01-06 PROCEDURE — 99213 PR OFFICE/OUTPT VISIT, EST, LEVL III, 20-29 MIN: ICD-10-PCS | Mod: 25,S$PBB,, | Performed by: PSYCHIATRY & NEUROLOGY

## 2020-01-06 PROCEDURE — 64611 PR CHEMODENERVATION PAROTID/SUBMANDIBULAR SALIVARY GLANDS,BILATERAL: ICD-10-PCS | Mod: S$PBB,,, | Performed by: PSYCHIATRY & NEUROLOGY

## 2020-01-06 PROCEDURE — 64611 CHEMODENERV SALIV GLANDS: CPT | Mod: PBBFAC | Performed by: PSYCHIATRY & NEUROLOGY

## 2020-01-06 PROCEDURE — 99213 OFFICE O/P EST LOW 20 MIN: CPT | Mod: 25,S$PBB,, | Performed by: PSYCHIATRY & NEUROLOGY

## 2020-01-06 PROCEDURE — 99999 PR PBB SHADOW E&M-EST. PATIENT-LVL III: CPT | Mod: PBBFAC,,, | Performed by: PSYCHIATRY & NEUROLOGY

## 2020-01-06 PROCEDURE — 1159F PR MEDICATION LIST DOCUMENTED IN MEDICAL RECORD: ICD-10-PCS | Mod: ,,, | Performed by: PSYCHIATRY & NEUROLOGY

## 2020-01-06 PROCEDURE — 1159F MED LIST DOCD IN RCRD: CPT | Mod: ,,, | Performed by: PSYCHIATRY & NEUROLOGY

## 2020-01-06 PROCEDURE — 99213 OFFICE O/P EST LOW 20 MIN: CPT | Mod: PBBFAC,25 | Performed by: PSYCHIATRY & NEUROLOGY

## 2020-01-06 PROCEDURE — 1126F AMNT PAIN NOTED NONE PRSNT: CPT | Mod: ,,, | Performed by: PSYCHIATRY & NEUROLOGY

## 2020-01-06 PROCEDURE — 1126F PR PAIN SEVERITY QUANTIFIED, NO PAIN PRESENT: ICD-10-PCS | Mod: ,,, | Performed by: PSYCHIATRY & NEUROLOGY

## 2020-01-06 RX ORDER — CARBIDOPA, LEVODOPA AND ENTACAPONE 50; 200; 200 MG/1; MG/1; MG/1
1 TABLET, FILM COATED ORAL 4 TIMES DAILY
Qty: 120 TABLET | Refills: 11 | Status: SHIPPED | OUTPATIENT
Start: 2020-01-06 | End: 2021-04-06

## 2020-01-06 RX ADMIN — ONABOTULINUMTOXINA 100 UNITS: 100 INJECTION, POWDER, LYOPHILIZED, FOR SOLUTION INTRADERMAL; INTRAMUSCULAR at 02:01

## 2020-01-06 NOTE — ASSESSMENT & PLAN NOTE
15 years of Parkinson's Disease, still ambulatory.  Physically appears under-medicated, but does not tolerate higher stalevo.   -> no changes to meds    -> botox today for siallorhea

## 2020-01-06 NOTE — PROGRESS NOTES
"I. Chief Complaints during this visit:   Follow up for Parkinson's disease     History of present illness:   69 y.o. M seen in routine f/u for PD.  Accompanied by daughter.    Excessive drooling is becoming more of an issue and both he and daughter interested in trying botox for this.  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    17  Has been doing well.  Walking in house- just uses wheelchair outside home.  If he takes too much stalevo, he gets cramping in legs, hallucinations.  In general, he seems to do best on twice a day stalevo.  Drooling, but says it's not a big concern for him.    19  Not seen in four years.  Checked in 20min late.  Accompanied by daughter.  Says he had basically been ok, had trouble getting appointment.  Pain medication for muscle spasm caused significant problems of hallucinations.  Daughter says he was also taking more PD meds because family was more involved as well.  So they cut back on PD meds and his hallucinations improved, but they have had increase back up in last few months because he was freezing often.  No recurrence of hallucinations.  Has been living with daughter for past 5 years.  Can do all his own ADLs.  Occasionally confused at night.  Recent home health.    Interval history 11/3/15:  Accompanied by daughter.  Increase in stalevo helped his gait, but not his FOG and perhaps made him more tired.    From my note 8/3/15:  Now his 3 yo grand-daughter is living with them, though this has been a good thing for them.  Walking is "lowsy".  Freezing often.  No falls.  The legs are swelling up, again, and PCP hesitant to give diuretic because of low bp.    From my note 14:  Is more cognizant of wearing off or late with medications.  Freezing of legs at time and cannot step back.    Feelings of near-faint has much improved.  Wife  6 weeks ago after an elective surgery.      From my note 13:  3-4 months ago had change " "in behavior and doing strange things like walking out of house only in diaper.  During PSG, 02 dropped to 84% and became agitated.  Since adjusting the CPAP settings (up from 10 to 15), he no longer has the odd behavior.  Dozes off during day and will awake confused, but nothing like he was 3-4 months ago.  Patient says he recalls the episode with the diaper- that he woke up with a start out of sleep, ran out of the house and when he was outside, wondered "what the heck" he was out there for.  In terms of PD, he feels he has progressed.  Fallen x 2, putting hole in sheetrock.  More difficulty walking, drooling increasing, urinary urgency x 6 months.  Low back pain for years, no difference in this.    II. Review of systems As in HPI, otherwise, balance 3 systems reviewed and are negative.   III. Past Medical history: PD ~2004, HTN, obesity, arthritis   Family history: Father with PD, paternal aunt with "essential tremor"   Social history: No tob or etoh, , disabled; two daughters.      Current medications: stalevo 200 BID   Allergies: Demerol, azilect (presyncope), mirapex (edema, weight)     IV. Physical Exam (focused)      Vitals:    01/06/20 1154   BP: 112/72   Pulse: 60   Height: 6' 1" (1.854 m)     General appearance: Well nourished, well developed, no acute distress.   +head drop      Cardiovascular:  pedal pulses 2, no edema or cyanosis, heart regular rate and rhythym, no carotid bruits.         -------------------------------------------------------------  Facial Expression: normal       Affect: full       Orientation to time & place:  Oriented to time, place, person and situation       Attention & concentration:  Normal attention span and concentration       Memory:  Not tested, but gave all his own history  Language: Spontaneous, fluent; able to repeat and name objects        Fund of knowledge:  Aware of current events        Speech:  normal (not " dysarthric)  -------------------------------------------------------  Cranial nerves: normal visual acuity, visual fields full, optic discs not well visualized due small pupils, pupils equal round and reactive, extraocular movements intact,       facial sensation intact, face symmetrical, hearing intact to whisper, palate raises midline, shoulder shrug strength normal, tongue protrudes midline.        -------------------------------------------------------  Musculoskeletal  Muscle tone: moderate tone BLE, mild RUE       Muscle Bulk: all 4 extremities normal        Muscle strength:  5/5 in all 4 extremities        No pronator drift  Sensation: Intact to light touch in all extremities          --------------------------------------------------------------  Cerebellar and Coordination  Gait:  Narrow, 3: Moderate: Requires an assistance device for safe walking (walking stick, walker) but not a person. +freezing of right foot       Finger-nose: no dysmetria       Rapid Alternating Movements (pronation/supination):  R normal; L normal  --------------------------------------------------------------  MOVEMENT DISORDERS FOCUSED EXAM  Abnormality of movement (bradykinesia, hyperkinesia) present? Yes , 3: Moderate: Moderate global slowness and poverty of spontaneous movements.   Tremor present?   No   Posture:  3: Moderate: Stooped posture, scoliosis or leaning to one side that cannot be corrected  volitionally to a normal posture by the patient.   Postural stability:  no Rhomberg        V. Laboratory/ Radiological Data: No new disease-specific data     VI. Medical Decision Making     Problem List Items Addressed This Visit        1 - High    PD (Parkinson's disease)    Overview     2004         Current Assessment & Plan     15 years of Parkinson's Disease, still ambulatory.  Physically appears under-medicated, but does not tolerate higher stalevo.   -> no changes to meds    -> botox today for siallorhea            2      "Drooling          Follow up: 4 months   ====================================================================    BOTULINUM TOXIN PROCEDURE NOTE FOR siallorrhea    Clinic Documentation for botulinum Injection   Amish Cruz   1950       Amish Cruz was seen in clinic today for botulinum injections for the treatment of siallorhea secondary to Parkinson's Disease.     Botulinum injections are medically necessary for this patient, due to risk of aspiration and effect on the patient's quality of life, causing social embarrassment.    Anticholinergics have not been effective.       PHYSICAL EXAM (FOCUSED):    Vitals:    01/06/20 1154   BP: 112/72   Pulse: 60   Height: 6' 1" (1.854 m)       healthy, alert, no distress      ASSESSMENT:  Siallorhea, appropriate for re-injections.    PLAN/PROCEDURE:  After explaining the most frequently reported adverse reactions in patients with siallorhea, I answered all the patient's questions and consent was obtained.       Two patient identifiers were confirmed with the patient prior to performing this procedure. A time out to determine correct patient and and agreement on procedure performed was conducted prior to the procedure.     The goal of the injections will be to reduce saliva production.   Using a 30 gauge injection needle and aseptic technique the following muscles were identified and injected with BOTOX.     BOTULINUM  INJECTION PROCEDURE:   Dilution: 100 units in 1cc normal saline    For siallorhea:  I injected a total of 70 units total of botox, with 35 units each into parotid gland (2 areas).    Patient supplied botulinum toxin?  no  Total amount of toxin used:  70 units  Total amount of toxin wasted:  30 units      "

## 2020-01-28 ENCOUNTER — TELEPHONE (OUTPATIENT)
Dept: NEUROLOGY | Facility: CLINIC | Age: 70
End: 2020-01-28

## 2020-01-28 NOTE — TELEPHONE ENCOUNTER
----- Message from Ghassan Santos sent at 1/28/2020  2:11 PM CST -----  Contact: Pt      The Pt states that you gave him Botox injections and was told to call you back to report how he is doing with it.    Phone # [phone]

## 2020-03-24 DIAGNOSIS — K11.7 SIALORRHEA: Primary | ICD-10-CM

## 2020-10-12 ENCOUNTER — TELEPHONE (OUTPATIENT)
Dept: UROLOGY | Facility: CLINIC | Age: 70
End: 2020-10-12

## 2020-10-12 NOTE — TELEPHONE ENCOUNTER
----- Message from Maggy Rush sent at 10/12/2020  9:30 AM CDT -----  Regarding: Callback  Name of Who is Calling: CAROLYN MI What is the request in detail: pt is requesting a callback concerning to see if he could have his appt over the phone due to the covid-19 Can the clinic reply by [facility]: NO What Number to Call Back if not in [facility]: [phone]

## 2020-10-13 ENCOUNTER — OFFICE VISIT (OUTPATIENT)
Dept: UROLOGY | Facility: CLINIC | Age: 70
End: 2020-10-13
Payer: MEDICARE

## 2020-10-13 ENCOUNTER — TELEPHONE (OUTPATIENT)
Dept: UROLOGY | Facility: CLINIC | Age: 70
End: 2020-10-13

## 2020-10-13 DIAGNOSIS — N40.0 ENLARGED PROSTATE: Primary | ICD-10-CM

## 2020-10-13 DIAGNOSIS — N39.41 URGENCY INCONTINENCE: ICD-10-CM

## 2020-10-13 PROCEDURE — 99214 PR OFFICE/OUTPT VISIT, EST, LEVL IV, 30-39 MIN: ICD-10-PCS | Mod: 95,,, | Performed by: UROLOGY

## 2020-10-13 PROCEDURE — 99214 OFFICE O/P EST MOD 30 MIN: CPT | Mod: 95,,, | Performed by: UROLOGY

## 2020-10-13 RX ORDER — TAMSULOSIN HYDROCHLORIDE 0.4 MG/1
1 CAPSULE ORAL DAILY
Qty: 90 CAPSULE | Refills: 3 | Status: SHIPPED | OUTPATIENT
Start: 2020-10-13

## 2020-10-13 RX ORDER — OXYBUTYNIN CHLORIDE 15 MG/1
15 TABLET, EXTENDED RELEASE ORAL DAILY
Qty: 90 TABLET | Refills: 3 | Status: SHIPPED | OUTPATIENT
Start: 2020-10-13

## 2020-10-13 NOTE — TELEPHONE ENCOUNTER
----- Message from Ruby Blanton sent at 10/13/2020  2:07 PM CDT -----    ----- Message -----  From: Carson Rene MD  Sent: 10/13/2020   1:47 PM CDT  To: Monica Davies NP, [facility] Staff    Please have the patient see Monica as soon as possible to check a urinalysis, postvoid residual and prostate examination

## 2020-10-13 NOTE — PROGRESS NOTES
Subjective:      Amish Cruz is a 69 y.o. male who returns today regarding his     The patient location is: home  The chief complaint leading to consultation is:     Elevated psa drawn by Dr Dallas    Pt is elderly and fraiil    PSA 17.1    No UTI symptoms  No prostatitits symptoms      Visit type: audiovisual    Face to Face time with patient: 15min    15 minutes of total time spent on the encounter, which includes face to face time and non-face to face time preparing to see the patient (eg, review of tests), Obtaining and/or reviewing separately obtained history, Documenting clinical information in the electronic or other health record, Independently interpreting results (not separately reported) and communicating results to the patient/family/caregiver, or Care coordination (not separately reported).         Each patient to whom he or she provides medical services by telemedicine is:  (1) informed of the relationship between the physician and patient and the respective role of any other health care provider with respect to management of the patient; and (2) notified that he or she may decline to receive medical services by telemedicine and may withdraw from such care at any time.    Notes:   .    The following portions of the patient's history were reviewed and updated as appropriate: allergies, current medications, past family history, past medical history, past social history, past surgical history and problem list.    Review of Systems  Pertinent items are noted in HPI.  A comprehensive multipoint review of systems was negative except as otherwise stated in the HPI.     Objective:   Vitals: There were no vitals taken for this visit.    Physical Exam   General: alert and oriented, no acute distress  Respiratory: Symmetric expansion, non-labored breathing  Cardiovascular: normal to inspection  Abdomen: non distended   Skin: normal coloration and turgor, no rashes, no suspicious skin lesions noted  Neuro:  no gross deficits  Psych: normal judgment and insight, normal mood/affect and non-anxious    Physical Exam    Lab Review   Urinalysis demonstrates no specimen    No results found for: WBC, HGB, HCT, MCV, PLT  No results found for: CREATININE, BUN    Imaging  -  Assessment and Plan:   Enlarged prostate    Urgency incontinence    Other orders  -     tamsulosin (FLOMAX) 0.4 mg Cap; Take 1 capsule (0.4 mg total) by mouth once daily.  Dispense: 90 capsule; Refill: 3  -     oxybutynin (DITROPAN XL) 15 MG TR24; Take 1 tablet (15 mg total) by mouth once daily.  Dispense: 90 tablet; Refill: 3    Elevated psa    We need to see pt asap to rule out UTI and check JENNIFFER.      In person visit to see NP ASAP for  PVR, UA,. JENNIFFER

## 2020-10-16 RX ORDER — POTASSIUM CHLORIDE 1500 MG/1
TABLET, EXTENDED RELEASE ORAL
COMMUNITY
Start: 2020-07-17

## 2020-10-19 NOTE — PROGRESS NOTES
Subjective:      Amish Cruz is a 69 y.o. male who returns today regarding his elevated PSA. He is an established patient of Dr. Rene' and is new to me today.    Elevated PSA  Patient is here with an elevated PSA. He has no personal history and no family history of prostate cancer. He has no prior genitourinary history of hematuria, hematospermia, prostatitis, urolithiasis, epididymal orchitis, previous  surgery.  Previous PSA values are :  17.1 (September 2020)  11 (December 2019)  5.1 (2017- on Proscar)  4.87 (2018- on Proscar)     At previous visits Dr. Rene and the patient had opted to stop checking PSAs due to the patient numerous comorbitities. His primary care doctor has continued to check however.     The patient recently resumed his Flomax and ditropan. He has not take Proscar in over a year. Denies bothersome urinary symptoms including dysuria, frequency, urgency. Denies flank pain and fever/chills. Denies a history of recurrent UTIs.     The following portions of the patient's history were reviewed and updated as appropriate: allergies, current medications, past family history, past medical history, past social history, past surgical history and problem list.    Review of Systems  Constitutional: no fever or chills  ENT: no nasal congestion or sore throat  Respiratory: no cough or shortness of breath  Cardiovascular: no chest pain or palpitations  Gastrointestinal: no nausea or vomiting, tolerating diet  Genitourinary: as per HPI  Hematologic/Lymphatic: no easy bruising or lymphadenopathy  Musculoskeletal: no arthralgias or myalgias  Neurological: no seizures or tremors  Behavioral/Psych: no auditory or visual hallucinations     Objective:   Vitals:   Vitals:    10/20/20 0924   BP: 133/82   Pulse: (!) 58       Physical Exam   General: alert and oriented, no acute distress  Head: normocephalic, atraumatic  Neck: supple, no lymphadenopathy, normal ROM, no masses  Respiratory: Symmetric  expansion, non-labored breathing  Cardiovascular: regular rate and rhythm, nomal pulses, no peripheral edema  Abdomen: soft, non tender, non distended, no palpable masses, no hernias, no hepatomegaly or splenomegaly  Genitourinary:   Prostate: enlarged: bilateral, irregular: midline, size: 35 gm; seminal vesicles not palpated  Rectum: normal rectal tone, no rectal mass, normal perineum  Lymphatic: no inguinal nodes  Skin: normal coloration and turgor, no rashes, no suspicious skin lesions noted  Neuro: alert and oriented x3, no gross deficits  Psych: normal judgment and insight, normal mood/affect and non-anxious  No CVA tenderness    Lab Review   Urinalysis demonstrates positive for leukocytes, red blood cells, protein  PVR: 0 mL    No results found for: WBC, HGB, HCT, MCV, PLT  No results found for: CREATININE, BUN  No results found for: PSA  Imaging   None    Assessment:   Elevated PSA  Urge incontinence   RCC    Plan:   Amish POWELL was seen today for follow-up.    Diagnoses and all orders for this visit:    Elevated PSA  -     POCT URINE DIPSTICK WITHOUT MICROSCOPE  -     Urine culture  -     PSA, Total and Free; Future    Urge incontinence  -     Urine culture    Renal cell carcinoma, unspecified laterality no evidence of disease nephrectomy 19 years ago by another urologist    Plan:  --Repeat PSA today, including % free  --Urine culture   --If PSA remains elevated with negative culture will consider prostate MRI for further evaluation

## 2020-10-20 ENCOUNTER — OFFICE VISIT (OUTPATIENT)
Dept: UROLOGY | Facility: CLINIC | Age: 70
End: 2020-10-20
Payer: MEDICARE

## 2020-10-20 VITALS
HEART RATE: 58 BPM | WEIGHT: 235 LBS | DIASTOLIC BLOOD PRESSURE: 82 MMHG | BODY MASS INDEX: 31.14 KG/M2 | SYSTOLIC BLOOD PRESSURE: 133 MMHG | HEIGHT: 73 IN

## 2020-10-20 DIAGNOSIS — R97.20 ELEVATED PSA: Primary | ICD-10-CM

## 2020-10-20 DIAGNOSIS — N39.41 URGE INCONTINENCE: ICD-10-CM

## 2020-10-20 DIAGNOSIS — C64.9 RENAL CELL CARCINOMA, UNSPECIFIED LATERALITY: ICD-10-CM

## 2020-10-20 PROCEDURE — 87184 SC STD DISK METHOD PER PLATE: CPT

## 2020-10-20 PROCEDURE — 99213 PR OFFICE/OUTPT VISIT, EST, LEVL III, 20-29 MIN: ICD-10-PCS | Mod: S$GLB,,, | Performed by: NURSE PRACTITIONER

## 2020-10-20 PROCEDURE — 87077 CULTURE AEROBIC IDENTIFY: CPT

## 2020-10-20 PROCEDURE — 87088 URINE BACTERIA CULTURE: CPT

## 2020-10-20 PROCEDURE — 87086 URINE CULTURE/COLONY COUNT: CPT

## 2020-10-20 PROCEDURE — 87186 SC STD MICRODIL/AGAR DIL: CPT

## 2020-10-20 PROCEDURE — 99213 OFFICE O/P EST LOW 20 MIN: CPT | Mod: S$GLB,,, | Performed by: NURSE PRACTITIONER

## 2020-10-21 ENCOUNTER — TELEPHONE (OUTPATIENT)
Dept: UROLOGY | Facility: CLINIC | Age: 70
End: 2020-10-21

## 2020-10-21 DIAGNOSIS — R97.20 ELEVATED PSA: Primary | ICD-10-CM

## 2020-10-21 RX ORDER — FINASTERIDE 5 MG/1
5 TABLET, FILM COATED ORAL DAILY
Qty: 30 TABLET | Refills: 11 | Status: SHIPPED | OUTPATIENT
Start: 2020-10-21 | End: 2021-10-21

## 2020-10-21 NOTE — TELEPHONE ENCOUNTER
----- Message from Monica Davies NP sent at 10/21/2020  1:18 PM CDT -----  Please schedule follow up with Dr. Rene in 6 months. Thanks!!

## 2020-10-24 LAB — BACTERIA UR CULT: ABNORMAL

## 2020-10-26 ENCOUNTER — PATIENT MESSAGE (OUTPATIENT)
Dept: UROLOGY | Facility: CLINIC | Age: 70
End: 2020-10-26

## 2020-10-26 DIAGNOSIS — N30.00 ACUTE CYSTITIS WITHOUT HEMATURIA: Primary | ICD-10-CM

## 2020-10-26 RX ORDER — CEPHALEXIN 500 MG/1
500 CAPSULE ORAL EVERY 12 HOURS
Qty: 14 CAPSULE | Refills: 0 | Status: SHIPPED | OUTPATIENT
Start: 2020-10-26 | End: 2020-11-02

## 2021-03-17 ENCOUNTER — TELEPHONE (OUTPATIENT)
Dept: UROLOGY | Facility: CLINIC | Age: 71
End: 2021-03-17

## 2021-03-18 ENCOUNTER — TELEPHONE (OUTPATIENT)
Dept: UROLOGY | Facility: CLINIC | Age: 71
End: 2021-03-18

## 2021-10-11 ENCOUNTER — TELEPHONE (OUTPATIENT)
Dept: UROLOGY | Facility: CLINIC | Age: 71
End: 2021-10-11

## 2021-10-18 ENCOUNTER — TELEPHONE (OUTPATIENT)
Dept: UROLOGY | Facility: CLINIC | Age: 71
End: 2021-10-18

## 2021-10-18 DIAGNOSIS — N40.1 BPH WITH OBSTRUCTION/LOWER URINARY TRACT SYMPTOMS: ICD-10-CM

## 2021-10-18 DIAGNOSIS — N40.0 ENLARGED PROSTATE: ICD-10-CM

## 2021-10-18 DIAGNOSIS — N13.8 BPH WITH OBSTRUCTION/LOWER URINARY TRACT SYMPTOMS: ICD-10-CM

## 2021-10-18 DIAGNOSIS — Z12.5 ENCOUNTER FOR SCREENING FOR MALIGNANT NEOPLASM OF PROSTATE: ICD-10-CM

## 2021-11-30 ENCOUNTER — TELEPHONE (OUTPATIENT)
Dept: NEUROLOGY | Facility: CLINIC | Age: 71
End: 2021-11-30
Payer: MEDICARE

## 2021-12-07 ENCOUNTER — TELEPHONE (OUTPATIENT)
Dept: NEUROLOGY | Facility: CLINIC | Age: 71
End: 2021-12-07
Payer: MEDICARE

## 2022-01-07 ENCOUNTER — OFFICE VISIT (OUTPATIENT)
Dept: NEUROLOGY | Facility: CLINIC | Age: 72
End: 2022-01-07
Payer: MEDICARE

## 2022-01-07 DIAGNOSIS — G20.C PARKINSONISM, UNSPECIFIED PARKINSONISM TYPE: ICD-10-CM

## 2022-01-07 DIAGNOSIS — I95.1 ORTHOSTATIC HYPOTENSION: ICD-10-CM

## 2022-01-07 PROCEDURE — 99215 OFFICE O/P EST HI 40 MIN: CPT | Mod: 95,,, | Performed by: PSYCHIATRY & NEUROLOGY

## 2022-01-07 PROCEDURE — 99215 PR OFFICE/OUTPT VISIT, EST, LEVL V, 40-54 MIN: ICD-10-PCS | Mod: 95,,, | Performed by: PSYCHIATRY & NEUROLOGY

## 2022-01-07 RX ORDER — CARBIDOPA AND LEVODOPA 25; 100 MG/1; MG/1
1 TABLET ORAL 4 TIMES DAILY
Qty: 120 TABLET | Refills: 11 | Status: SHIPPED | OUTPATIENT
Start: 2022-01-07 | End: 2022-09-01

## 2022-01-07 NOTE — ASSESSMENT & PLAN NOTE
Profound orthostatic Hypotension  It seems his entacapone is making carbidopa/levodopa 25/100mg rather potent and his Bps are dropping precipitously - causing syncope, double vision, fatigue, confusion  Suggested change Stalevo to carbidopa/levodopa 25/100mg 1 tab PO QID   May need to make more adjustments afterwards  Suggested abdominal binder above suprapubic cath if possible  Continue midodrine per cardiology- may also consider florinef or droxidopa  NO tamsulosin

## 2022-01-07 NOTE — PROGRESS NOTES
"The patient location is: home  The chief complaint leading to consultation is: PD    Visit type: audiovisual    Face to Face time with patient: 40mins  40 minutes of total time spent on the encounter, which includes face to face time and non-face to face time preparing to see the patient (eg, review of tests), Obtaining and/or reviewing separately obtained history, Documenting clinical information in the electronic or other health record, Independently interpreting results (not separately reported) and communicating results to the patient/family/caregiver, or Care coordination (not separately reported).         Each patient to whom he or she provides medical services by telemedicine is:  (1) informed of the relationship between the physician and patient and the respective role of any other health care provider with respect to management of the patient; and (2) notified that he or she may decline to receive medical services by telemedicine and may withdraw from such care at any time.    Notes:       I. Chief Complaints during this visit:       Interval Hx    Since last visit,   Pt with Afib, Longstanding PD since 15 years.  Saw Dr. Booker 2 years prior. 4 months of decline per daughter.  Issues with hypotension, and mobility changes, double vision subacutely.    Continues Stalevo 50/200/200mg 1/2 tab, 3x/day - the change was made several weeks ago 2x/day to 3x/day  1 full tab caused hallucinations  ONtime is not clear now    In terms of Bps, dipping into  70/40's, after which he feels "dopey"  No longer on tamsulosin  No longer on antihypertensives  Started midodrine 2.5mg PO BID 1 week ago  When he was on midodrine 5mg PO BID his blood pressure spiked to 220/180    Also at night he's disoriented and think he's in TV shows    Prior to 4 mos was walking with a walker to recliner with assist. Wheelchair for distance. Rarely fell.     Allergies: Demerol, azilect (presyncope), mirapex (edema, weight) " "  ________________________________________    History of present illness:   71 y.o. M seen in routine f/u for PD.  Accompanied by daughter.    Excessive drooling is becoming more of an issue and both he and daughter interested in trying botox for this.  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    17  Has been doing well.  Walking in house- just uses wheelchair outside home.  If he takes too much stalevo, he gets cramping in legs, hallucinations.  In general, he seems to do best on twice a day stalevo.  Drooling, but says it's not a big concern for him.    19  Not seen in four years.  Checked in 20min late.  Accompanied by daughter.  Says he had basically been ok, had trouble getting appointment.  Pain medication for muscle spasm caused significant problems of hallucinations.  Daughter says he was also taking more PD meds because family was more involved as well.  So they cut back on PD meds and his hallucinations improved, but they have had increase back up in last few months because he was freezing often.  No recurrence of hallucinations.  Has been living with daughter for past 5 years.  Can do all his own ADLs.  Occasionally confused at night.  Recent home health.    Interval history 11/3/15:  Accompanied by daughter.  Increase in stalevo helped his gait, but not his FOG and perhaps made him more tired.    From my note 8/3/15:  Now his 1 yo grand-daughter is living with them, though this has been a good thing for them.  Walking is "lowsy".  Freezing often.  No falls.  The legs are swelling up, again, and PCP hesitant to give diuretic because of low bp.    From my note 14:  Is more cognizant of wearing off or late with medications.  Freezing of legs at time and cannot step back.    Feelings of near-faint has much improved.  Wife  6 weeks ago after an elective surgery.      From my note 13:  3-4 months ago had change in behavior and doing strange " "things like walking out of house only in diaper.  During PSG, 02 dropped to 84% and became agitated.  Since adjusting the CPAP settings (up from 10 to 15), he no longer has the odd behavior.  Dozes off during day and will awake confused, but nothing like he was 3-4 months ago.  Patient says he recalls the episode with the diaper- that he woke up with a start out of sleep, ran out of the house and when he was outside, wondered "what the heck" he was out there for.  In terms of PD, he feels he has progressed.  Fallen x 2, putting hole in sheetrock.  More difficulty walking, drooling increasing, urinary urgency x 6 months.  Low back pain for years, no difference in this.    II. Review of systems As in HPI, otherwise, balance 3 systems reviewed and are negative.   III. Past Medical history: PD ~2004, HTN, obesity, arthritis   Family history: Father with PD, paternal aunt with "essential tremor"   Social history: No tob or etoh, , disabled; two daughters.      Current medications: stalevo 200 BID   Allergies: Demerol, azilect (presyncope), mirapex (edema, weight)     IV. Physical Exam (focused)      Physical Exam  Constitutional: Well-developed, well-nourished, appears stated age  Eyes: No scleral icterus  ENT: Moist oral mucosa  Cardiovascular: No lower extremity edema   Respiratory: No labored breathing   Skin: No rash   Hematologic: No bruising    Other: GI/ deferred   · Mental status: Drowsy (his BP is low), slumping in his chair  · follows commands  · Speech: normal (not dysarthric), no aphasia  · Cranial nerves:            · CN II: Pupils mid-position and equal, not tested light or accommodation  · CN III, IV, VI: Extraocular movements full, no nystagmus visualized  · CN V: Not tested   · CN VII: Face strong and symmetric bilaterally   · CN VIII: Hearing intact to voice and conversation   · CN IX, X: Palate raises midline and symmetric   · CN XI: Strong shoulder shrug B/L  · CN XII: Tongue appears midline "   · Motor: Normal bulk by appearance, no drift   · Sensory: Not tested    · Gait: Not tested  · Deep tendon reflexes: Not tested  · Movement/Coordination                    Mod hypophonic speech.                     Mod to severe facial masking.  No bradykinesia.                   No tremor with rest, posture, kinesis, or intention.                     No other dystonia, chorea, athetosis, myoclonus, or tics visualized.    Bradykinesia  ? Finger taps Finger flicks REMA Heel taps   Left 2+ 2+     Right 3+ 3+         No visualized tremor      V. Laboratory/ Radiological Data: No new disease-specific data     VI. Medical Decision Making     Problem List Items Addressed This Visit        Neuro    Parkinsonism    Overview     2004         Current Assessment & Plan     Longstanding PD  See Orthostasis            Cardiac/Vascular    Orthostatic hypotension    Current Assessment & Plan       Profound orthostatic Hypotension  It seems his entacapone is making carbidopa/levodopa 25/100mg rather potent and his Bps are dropping precipitously - causing syncope, double vision, fatigue, confusion  Suggested change Stalevo to carbidopa/levodopa 25/100mg 1 tab PO QID   May need to make more adjustments afterwards  Suggested abdominal binder above suprapubic cath if possible  Continue midodrine per cardiology- may also consider florinef or droxidopa  NO tamsulosin               Diana Mason MD, MS  [facility]  Department of Neurology  Movement Disorders

## 2022-02-21 ENCOUNTER — PATIENT MESSAGE (OUTPATIENT)
Dept: NEUROLOGY | Facility: CLINIC | Age: 72
End: 2022-02-21
Payer: MEDICARE

## 2022-05-05 ENCOUNTER — PATIENT MESSAGE (OUTPATIENT)
Dept: RESEARCH | Facility: HOSPITAL | Age: 72
End: 2022-05-05
Payer: MEDICARE

## 2022-08-29 ENCOUNTER — TELEPHONE (OUTPATIENT)
Dept: NEUROLOGY | Facility: CLINIC | Age: 72
End: 2022-08-29
Payer: MEDICARE

## 2022-08-29 NOTE — TELEPHONE ENCOUNTER
----- Message from Paige Savage sent at 8/29/2022 11:05 AM CDT -----  Regarding: Appointment  Contact: Caitlyn/daughter [phone]  Calling in regards to schedule an appointment as soon as possible for Parkinson's. Please call to schedule and discuss medications and worsening of symptoms.

## 2022-08-29 NOTE — TELEPHONE ENCOUNTER
Tried to contact PT per message sent on today requesting appt, PT's phone went directly to voicemail/unable to leave voicemail Called twice.

## 2022-08-30 ENCOUNTER — TELEPHONE (OUTPATIENT)
Dept: NEUROLOGY | Facility: CLINIC | Age: 72
End: 2022-08-30
Payer: MEDICARE

## 2022-08-30 NOTE — TELEPHONE ENCOUNTER
----- Message from Mary Villagomez sent at 8/30/2022 11:43 AM CDT -----  Regarding: appt  Contact: Kalee Cruz @[phone]  Caller is daughter , Kalee Cruz, asking to schedule for pt. Pls call. Will do another virtual or come to office.

## 2022-08-31 ENCOUNTER — TELEPHONE (OUTPATIENT)
Dept: NEUROLOGY | Facility: CLINIC | Age: 72
End: 2022-08-31
Payer: MEDICARE

## 2022-08-31 NOTE — TELEPHONE ENCOUNTER
Contacted Pts daughter for F/U appt. PT last seen in January and family unaware of the reason for lack of F/U appts. PT was given a next day appt to help PT get back on track with routine care. Pts daughter voiced understanding and confirmed attendance for tomorrow's appt.

## 2022-09-01 ENCOUNTER — OFFICE VISIT (OUTPATIENT)
Dept: NEUROLOGY | Facility: CLINIC | Age: 72
End: 2022-09-01
Payer: MEDICARE

## 2022-09-01 VITALS — SYSTOLIC BLOOD PRESSURE: 148 MMHG | HEART RATE: 71 BPM | DIASTOLIC BLOOD PRESSURE: 107 MMHG

## 2022-09-01 DIAGNOSIS — R41.3 MEMORY CHANGE: ICD-10-CM

## 2022-09-01 DIAGNOSIS — G20.C PARKINSONISM, UNSPECIFIED PARKINSONISM TYPE: Primary | ICD-10-CM

## 2022-09-01 PROCEDURE — 99999 PR PBB SHADOW E&M-EST. PATIENT-LVL III: ICD-10-PCS | Mod: PBBFAC,,, | Performed by: PSYCHIATRY & NEUROLOGY

## 2022-09-01 PROCEDURE — 99215 PR OFFICE/OUTPT VISIT, EST, LEVL V, 40-54 MIN: ICD-10-PCS | Mod: S$PBB,,, | Performed by: PSYCHIATRY & NEUROLOGY

## 2022-09-01 PROCEDURE — 99215 OFFICE O/P EST HI 40 MIN: CPT | Mod: S$PBB,,, | Performed by: PSYCHIATRY & NEUROLOGY

## 2022-09-01 PROCEDURE — 99213 OFFICE O/P EST LOW 20 MIN: CPT | Mod: PBBFAC | Performed by: PSYCHIATRY & NEUROLOGY

## 2022-09-01 PROCEDURE — 99999 PR PBB SHADOW E&M-EST. PATIENT-LVL III: CPT | Mod: PBBFAC,,, | Performed by: PSYCHIATRY & NEUROLOGY

## 2022-09-01 RX ORDER — POTASSIUM CHLORIDE 1.5 G/1.58G
20 POWDER, FOR SOLUTION ORAL DAILY
COMMUNITY
Start: 2021-12-14

## 2022-09-01 RX ORDER — CARBIDOPA AND LEVODOPA 25; 100 MG/1; MG/1
1.5 TABLET ORAL
Qty: 225 TABLET | Refills: 11 | Status: SHIPPED | OUTPATIENT
Start: 2022-09-01 | End: 2023-10-11

## 2022-09-01 NOTE — PROGRESS NOTES
I. Chief Complaints during this visit:       Interval Hx    Since last visit,   Pt with Afib, Longstanding PD since 15 years.    Did very well movement wide after stopped entcapone - hallucinations stopped  Started carbidopa/levodopa 25/100mg 1 tab PO 5x daily - was moving well with a  walker  Since 1 week ago he is now back to wheelchair bound    At times confused in the AM    Has a suprapubic catheter  No Fever recently    Issues with hypotension prior  Off midodrine    Prior  midodrine 2.5mg PO BID prior  When he was on midodrine 5mg PO BID his blood pressure spiked to 220/180    Allergies: Demerol, azilect (presyncope), mirapex (edema, weight)   ________________________________________    History of present illness:   71 y.o. M seen in routine f/u for PD.  Accompanied by daughter.    Excessive drooling is becoming more of an issue and both he and daughter interested in trying botox for this.  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    8/27/19  Has been doing well.  Walking in house- just uses wheelchair outside home.  If he takes too much stalevo, he gets cramping in legs, hallucinations.  In general, he seems to do best on twice a day stalevo.  Drooling, but says it's not a big concern for him.    4/5/19  Not seen in four years.  Checked in 20min late.  Accompanied by daughter.  Says he had basically been ok, had trouble getting appointment.  Pain medication for muscle spasm caused significant problems of hallucinations.  Daughter says he was also taking more PD meds because family was more involved as well.  So they cut back on PD meds and his hallucinations improved, but they have had increase back up in last few months because he was freezing often.  No recurrence of hallucinations.  Has been living with daughter for past 5 years.  Can do all his own ADLs.  Occasionally confused at night.  Recent home health.    Interval history 11/3/15:  Accompanied by  "daughter.  Increase in stalevo helped his gait, but not his FOG and perhaps made him more tired.    From my note 8/3/15:  Now his 1 yo grand-daughter is living with them, though this has been a good thing for them.  Walking is "lowsy".  Freezing often.  No falls.  The legs are swelling up, again, and PCP hesitant to give diuretic because of low bp.    From my note 14:  Is more cognizant of wearing off or late with medications.  Freezing of legs at time and cannot step back.    Feelings of near-faint has much improved.  Wife  6 weeks ago after an elective surgery.      From my note 13:  3-4 months ago had change in behavior and doing strange things like walking out of house only in diaper.  During PSG, 02 dropped to 84% and became agitated.  Since adjusting the CPAP settings (up from 10 to 15), he no longer has the odd behavior.  Dozes off during day and will awake confused, but nothing like he was 3-4 months ago.  Patient says he recalls the episode with the diaper- that he woke up with a start out of sleep, ran out of the house and when he was outside, wondered "what the heck" he was out there for.  In terms of PD, he feels he has progressed.  Fallen x 2, putting hole in sheetrock.  More difficulty walking, drooling increasing, urinary urgency x 6 months.  Low back pain for years, no difference in this.    II. Review of systems As in HPI, otherwise, balance 3 systems reviewed and are negative.   III. Past Medical history: PD ~, HTN, obesity, arthritis   Family history: Father with PD, paternal aunt with "essential tremor"   Social history: No tob or etoh, , disabled; two daughters.      Current medications: stalevo 200 BID   Allergies: Demerol, azilect (presyncope), mirapex (edema, weight)     IV. Physical Exam (focused)      Physical Exam  Constitutional: Well-developed, well-nourished, appears stated age  Eyes: No scleral icterus  ENT: Moist oral mucosa  Cardiovascular: No lower extremity " edema   Respiratory: No labored breathing   Skin: No rash   Hematologic: No bruising    Other: GI/ deferred   Mental status: Drowsy (his BP is low), slumping in his chair  follows commands  Speech: normal (not dysarthric), no aphasia  Cranial nerves:            CN II: Pupils mid-position and equal, not tested light or accommodation  CN III, IV, VI: Extraocular movements full, no nystagmus visualized  CN V: Not tested   CN VII: Face strong and symmetric bilaterally   CN VIII: Hearing intact to voice and conversation   CN IX, X: Palate raises midline and symmetric   CN XI: Strong shoulder shrug B/L  CN XII: Tongue appears midline   Motor: Normal bulk by appearance, no drift   Sensory: Not tested    Gait: Not tested  Deep tendon reflexes: Not tested  Movement/Coordination                    Mod hypophonic speech.                     Severe facial masking.                     No tremor with rest, posture, kinesis, or intention.                  Bradykinesia  ? Finger taps Finger flicks REMA Heel taps   Left 4+ 4+     Right 3+ 3+         No visualized tremor      V. Laboratory/ Radiological Data: No new disease-specific data     VI. Medical Decision Making     Problem List Items Addressed This Visit          Neuro    Parkinsonism    Overview     2004         Current Assessment & Plan     Longstanding PD with recent decline  Severe rigidity    Suggested change carbidopa/levodopa 25/100mg 1 tab PO 5x daily to 1.5 tabs 5x daily  May need to re-add midodrine if hypotension returns  Suggested avoid protein with pilltimes              Diana Mason MD, MS [facility]  Department of Neurology  Movement Disorders

## 2022-09-01 NOTE — ASSESSMENT & PLAN NOTE
Longstanding PD with recent decline  Severe rigidity    Suggested change carbidopa/levodopa 25/100mg 1 tab PO 5x daily to 1.5 tabs 5x daily  May need to re-add midodrine if hypotension returns  Suggested avoid protein with pilltimes

## 2022-09-28 ENCOUNTER — TELEPHONE (OUTPATIENT)
Dept: NEUROLOGY | Facility: CLINIC | Age: 72
End: 2022-09-28
Payer: MEDICARE

## 2023-05-23 ENCOUNTER — PATIENT MESSAGE (OUTPATIENT)
Dept: RESEARCH | Facility: HOSPITAL | Age: 73
End: 2023-05-23
Payer: MEDICARE

## 2023-10-11 RX ORDER — CARBIDOPA AND LEVODOPA 25; 100 MG/1; MG/1
1.5 TABLET ORAL
Qty: 225 TABLET | Refills: 11 | Status: SHIPPED | OUTPATIENT
Start: 2023-10-11 | End: 2023-10-16

## 2023-10-16 ENCOUNTER — OFFICE VISIT (OUTPATIENT)
Dept: NEUROLOGY | Facility: CLINIC | Age: 73
End: 2023-10-16
Payer: MEDICARE

## 2023-10-16 DIAGNOSIS — G20.A2 PARKINSON'S DISEASE WITHOUT DYSKINESIA, WITH FLUCTUATING MANIFESTATIONS: ICD-10-CM

## 2023-10-16 DIAGNOSIS — R41.3 MEMORY CHANGE: ICD-10-CM

## 2023-10-16 DIAGNOSIS — G20.C PARKINSONISM, UNSPECIFIED PARKINSONISM TYPE: Primary | ICD-10-CM

## 2023-10-16 PROCEDURE — 99215 OFFICE O/P EST HI 40 MIN: CPT | Mod: 95,,, | Performed by: PSYCHIATRY & NEUROLOGY

## 2023-10-16 PROCEDURE — 99215 PR OFFICE/OUTPT VISIT, EST, LEVL V, 40-54 MIN: ICD-10-PCS | Mod: 95,,, | Performed by: PSYCHIATRY & NEUROLOGY

## 2023-10-16 RX ORDER — CARBIDOPA AND LEVODOPA 25; 100 MG/1; MG/1
1.5 TABLET ORAL
Qty: 225 TABLET | Refills: 11 | Status: SHIPPED | OUTPATIENT
Start: 2023-10-16 | End: 2024-10-15

## 2023-10-16 NOTE — PROGRESS NOTES
The patient location is: HOME  The chief complaint leading to visit is: iPD  1. Parkinsonism, unspecified Parkinsonism type  Ambulatory referral/consult to Home Health        Visit type: Virtual visit with synchronous audio and video  Total time spent with patient: 20  Each patient to whom he or she provides medical services by telemedicine is:  (1) informed of the relationship between the physician and patient and the respective role of any other health care provider with respect to management of the patient; and (2) notified that he or she may decline to receive medical services by telemedicine and may withdraw from such care at any time.    I. Chief Complaints during this visit:       Interval Hx    Since last visit,   Pt with Afib, Longstanding PD since 15 years.  Fully dependent    Since last visit,   Taking carbidopa/levodopa 25/100mg 1.5 tab PO QID- five times  Better mobility  Having on and off hallucinations, delusions, labile moods    BP fluctuates highly  Wheelchair bound and sleeps in a recliner  SBP sometimes 70/40 and 150/90  Has a suprapubic catheter and this prevents abdominal binder use  Wears compression stockings on and off    At times confused in the AM  Issues with hypotension prior  Off midodrine    Prior  midodrine 2.5mg PO BID prior  When he was on midodrine 5mg PO BID his blood pressure spiked to 220/180    Allergies: Demerol, azilect (presyncope), mirapex (edema, weight)   ________________________________________    History of present illness:   72 y.o. M seen in routine f/u for PD.  Accompanied by daughter.    Excessive drooling is becoming more of an issue and both he and daughter interested in trying botox for this.  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    8/27/19  Has been doing well.  Walking in house- just uses wheelchair outside home.  If he takes too much stalevo, he gets cramping in legs, hallucinations.  In general, he seems to do  "best on twice a day stalevo.  Drooling, but says it's not a big concern for him.    19  Not seen in four years.  Checked in 20min late.  Accompanied by daughter.  Says he had basically been ok, had trouble getting appointment.  Pain medication for muscle spasm caused significant problems of hallucinations.  Daughter says he was also taking more PD meds because family was more involved as well.  So they cut back on PD meds and his hallucinations improved, but they have had increase back up in last few months because he was freezing often.  No recurrence of hallucinations.  Has been living with daughter for past 5 years.  Can do all his own ADLs.  Occasionally confused at night.  Recent home health.    Interval history 11/3/15:  Accompanied by daughter.  Increase in stalevo helped his gait, but not his FOG and perhaps made him more tired.    From my note 8/3/15:  Now his 1 yo grand-daughter is living with them, though this has been a good thing for them.  Walking is "lowsy".  Freezing often.  No falls.  The legs are swelling up, again, and PCP hesitant to give diuretic because of low bp.    From my note 14:  Is more cognizant of wearing off or late with medications.  Freezing of legs at time and cannot step back.    Feelings of near-faint has much improved.  Wife  6 weeks ago after an elective surgery.      From my note 13:  3-4 months ago had change in behavior and doing strange things like walking out of house only in diaper.  During PSG, 02 dropped to 84% and became agitated.  Since adjusting the CPAP settings (up from 10 to 15), he no longer has the odd behavior.  Dozes off during day and will awake confused, but nothing like he was 3-4 months ago.  Patient says he recalls the episode with the diaper- that he woke up with a start out of sleep, ran out of the house and when he was outside, wondered "what the heck" he was out there for.  In terms of PD, he feels he has progressed.  Fallen x 2, " "putting hole in sheetrock.  More difficulty walking, drooling increasing, urinary urgency x 6 months.  Low back pain for years, no difference in this.    II. Review of systems As in HPI, otherwise, balance 3 systems reviewed and are negative.   III. Past Medical history: PD ~2004, HTN, obesity, arthritis   Family history: Father with PD, paternal aunt with "essential tremor"   Social history: No tob or etoh, , disabled; two daughters.      Current medications: stalevo 200 BID   Allergies: Demerol, azilect (presyncope), mirapex (edema, weight)     IV. Physical Exam (focused)      Physical Exam  Constitutional: Well-developed, well-nourished, appears stated age  Eyes: No scleral icterus  ENT: Moist oral mucosa  Cardiovascular: No lower extremity edema   Respiratory: No labored breathing   Skin: No rash   Hematologic: No bruising    Other: GI/ deferred   Mental status: Drowsy (his BP is low), slumping in his chair  follows commands  Speech: normal (not dysarthric), no aphasia  Cranial nerves:            CN II: Pupils mid-position and equal, not tested light or accommodation  CN III, IV, VI: Extraocular movements full, no nystagmus visualized  CN V: Not tested   CN VII: Face strong and symmetric bilaterally   CN VIII: Hearing intact to voice and conversation   CN IX, X: Palate raises midline and symmetric   CN XI: Strong shoulder shrug B/L  CN XII: Tongue appears midline   Motor: Normal bulk by appearance, no drift   Sensory: Not tested    Gait: Not tested  Deep tendon reflexes: Not tested  Movement/Coordination                    Severe hypophonic speech.                     Severe facial masking.  R leading cervical dystonia                   No tremor with rest, posture, kinesis, or intention.                  Bradykinesia  ? Finger taps Finger flicks REMA Heel taps   Left 4+ 4+     Right 3+ 3+         No visualized tremor      V. Laboratory/ Radiological Data: No new disease-specific data     VI. Medical " Decision Making     Problem List Items Addressed This Visit          Neuro    Parkinsonism - Primary    Overview     2004         Current Assessment & Plan     Longstanding PD with recent decline  Severe rigidity    Suggested change carbidopa/levodopa 25/100mg 1 tab PO 5x daily to 1.5 tabs 5x daily which helped dexterity  Suggested PT OT speech    Now more paranoid and hallucinationg  Suggested Aricept 5mg QHS- need recent EKG and visit to cardiology soon           Relevant Orders    Ambulatory referral/consult to Home Health           Diana Mason MD, MS [facility]  Department of Neurology  Movement Disorders

## 2023-10-16 NOTE — ASSESSMENT & PLAN NOTE
Longstanding PD with recent decline  Severe rigidity    Suggested change carbidopa/levodopa 25/100mg 1 tab PO 5x daily to 1.5 tabs 5x daily which helped dexterity  Suggested PT OT speech    Now more paranoid and hallucinationg  Suggested Aricept 5mg QHS- need recent EKG and visit to cardiology soon

## 2023-11-07 ENCOUNTER — TELEPHONE (OUTPATIENT)
Dept: NEUROLOGY | Facility: CLINIC | Age: 73
End: 2023-11-07
Payer: MEDICARE

## 2023-11-07 NOTE — TELEPHONE ENCOUNTER
----- Message from Lupis Cameron MA sent at 11/7/2023  9:37 AM CST -----  Regarding: pt  advice  Contact: pt [phone]  Patient  daughter  called to let Dr Mason know that patient  have done  his EKG  and  wanted to make sure provider received it please call pt @[phone]

## 2023-11-07 NOTE — TELEPHONE ENCOUNTER
Left voicemail for returned call to informed we have not received the EKG and to have it faxed to our office.

## 2024-02-07 ENCOUNTER — PATIENT MESSAGE (OUTPATIENT)
Dept: RESEARCH | Facility: HOSPITAL | Age: 74
End: 2024-02-07
Payer: MEDICARE

## 2024-03-14 NOTE — PROGRESS NOTES
Subjective:      Amish Cruz is a 67 y.o. male who returns today regarding his     Enlarged prostate on Flomax and Proscar.  His PSA this year's about the same level was last year.  The patient has advanced Parkinson's and is nonambulatory he is also on anticoagulation.  We discussed PSA in the past and his risk of prostate cancer and have chosen to stop checking PSAs and not to pursue prostate biopsy.    He is also on Ditropan for overactive bladder.  Overall his urinary symptoms are status quo.    The following portions of the patient's history were reviewed and updated as appropriate: allergies, current medications, past family history, past medical history, past social history, past surgical history and problem list.    Review of Systems  Pertinent items are noted in HPI.  A comprehensive multipoint review of systems was negative except as otherwise stated in the HPI.     Objective:   Vitals: /75 (BP Location: Left arm, Patient Position: Sitting, BP Method: Large (Automatic))   Pulse 62   Ht 6' (1.829 m)   Wt 116.1 kg (256 lb)   BMI 34.72 kg/m²      Physical Exam   General: alert and oriented, no acute distress  Respiratory: Symmetric expansion, non-labored breathing  Cardiovascular: no peripheral edema  Abdomen: non distended   Skin: normal coloration and turgor, no rashes, no suspicious skin lesions noted  Neuro: no gross deficits  Psych: normal judgment and insight, normal mood/affect and non-anxious  Prostate 40-50 g no nodules  Physical Exam    Lab Review   Urinalysis demonstrates negative for all components  No results found for: WBC, HGB, HCT, MCV, PLT  No results found for: CREATININE, BUN      psa 5.1 2017  4.87 2018    Imaging  Postvoid residual 65 cc  Assessment and Plan:   Enlarged prostate    Urgency incontinence    Other orders  -     finasteride (PROSCAR) 5 mg tablet; Take 1 tablet (5 mg total) by mouth once daily.  Dispense: 90 tablet; Refill: 3  -     oxybutynin (DITROPAN XL) 15 MG  TR24; Take 1 tablet (15 mg total) by mouth once daily.  Dispense: 90 tablet; Refill: 3  -     tamsulosin (FLOMAX) 0.4 mg Cp24; Take 1 capsule (0.4 mg total) by mouth once daily.  Dispense: 90 capsule; Refill: 3      We again discussed his risk of prostate cancer and the role of PSA testing.  Given his comorbidities we've chosen to stop checking PSA.  He will return in 1 year for repeat postvoid residual and prostate exam or sooner if any issues   [FreeTextEntry3] : All medical record entries made by the Scribe were at my, Dr. Scott Ramirez's, direction and personally dictated by me on 03/12/2024. I have reviewed the chart and agree that the record accurately reflects my personal performance of the history, physical exam, assessment and plan. I have also personally directed, reviewed, and agreed with the chart.

## 2024-05-13 ENCOUNTER — PATIENT MESSAGE (OUTPATIENT)
Dept: NEUROLOGY | Facility: CLINIC | Age: 74
End: 2024-05-13
Payer: MEDICARE

## 2024-05-22 ENCOUNTER — PATIENT MESSAGE (OUTPATIENT)
Dept: NEUROLOGY | Facility: CLINIC | Age: 74
End: 2024-05-22
Payer: MEDICARE

## 2024-06-17 ENCOUNTER — OFFICE VISIT (OUTPATIENT)
Dept: NEUROLOGY | Facility: CLINIC | Age: 74
End: 2024-06-17
Payer: MEDICARE

## 2024-06-17 DIAGNOSIS — R41.3 MEMORY CHANGE: ICD-10-CM

## 2024-06-17 DIAGNOSIS — G20.C PARKINSONISM, UNSPECIFIED PARKINSONISM TYPE: ICD-10-CM

## 2024-06-17 DIAGNOSIS — E66.01 MORBID OBESITY: Primary | ICD-10-CM

## 2024-06-17 DIAGNOSIS — K11.7 DROOLING: ICD-10-CM

## 2024-06-17 DIAGNOSIS — G20.A2 PARKINSON'S DISEASE WITHOUT DYSKINESIA, WITH FLUCTUATING MANIFESTATIONS: ICD-10-CM

## 2024-06-17 PROCEDURE — 99215 OFFICE O/P EST HI 40 MIN: CPT | Mod: 95,,, | Performed by: PSYCHIATRY & NEUROLOGY

## 2024-06-17 RX ORDER — ATROPINE SULFATE 10 MG/ML
1 SOLUTION/ DROPS OPHTHALMIC 3 TIMES DAILY
Qty: 30 ML | Refills: 12 | Status: SHIPPED | OUTPATIENT
Start: 2024-06-17

## 2024-06-17 NOTE — PROGRESS NOTES
The patient location is: HOME  The chief complaint leading to visit is: iPD  1. Morbid obesity        2. Parkinsonism, unspecified Parkinsonism type        3. Memory change        4. Parkinson's disease without dyskinesia, with fluctuating manifestations        5. Drooling          Visit type: Virtual visit with synchronous audio and video  Total time spent with patient: 20  Each patient to whom he or she provides medical services by telemedicine is:  (1) informed of the relationship between the physician and patient and the respective role of any other health care provider with respect to management of the patient; and (2) notified that he or she may decline to receive medical services by telemedicine and may withdraw from such care at any time.    I. Chief Complaints during this visit:       Interval Hx    Since last visit,   Pt with Afib, Longstanding PD since 15 years.  Fully dependent - wheelchair bound    No dysphagia    Confusions and hallucinations ongoing  At times does not accept hallucinations as not real  Has not yet started donepezil 5mg QHS - awaiting clearance from cardiology OSH    Since last visit,   Taking carbidopa/levodopa 25/100mg 1.5 tab PO BID to TID  Better mobility  Having on and off hallucinations, delusions, labile moods    BP fluctuates highly  Wheelchair bound and sleeps in a recliner  Has a suprapubic catheter and this prevents abdominal binder use  Wears compression stockings on and off    At times confused in the AM    Prior  midodrine 2.5mg PO BID prior  When he was on midodrine 5mg PO BID his blood pressure spiked to 220/180    Allergies: Demerol, azilect (presyncope), mirapex (edema, weight)   ________________________________________    History of present illness:   73 y.o. M seen in routine f/u for PD.  Accompanied by daughter.    Excessive drooling is becoming more of an issue and both he and daughter interested in trying botox for this.  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    19  Has been doing well.  Walking in house- just uses wheelchair outside home.  If he takes too much stalevo, he gets cramping in legs, hallucinations.  In general, he seems to do best on twice a day stalevo.  Drooling, but says it's not a big concern for him.    19  Not seen in four years.  Checked in 20min late.  Accompanied by daughter.  Says he had basically been ok, had trouble getting appointment.  Pain medication for muscle spasm caused significant problems of hallucinations.  Daughter says he was also taking more PD meds because family was more involved as well.  So they cut back on PD meds and his hallucinations improved, but they have had increase back up in last few months because he was freezing often.  No recurrence of hallucinations.  Has been living with daughter for past 5 years.  Can do all his own ADLs.  Occasionally confused at night.  Recent home health.    Interval history 11/3/15:  Accompanied by daughter.  Increase in stalevo helped his gait, but not his FOG and perhaps made him more tired.    From my note 8/3/15:  Now his 3 yo grand-daughter is living with them, though this has been a good thing for them.  Walking is "lowsy".  Freezing often.  No falls.  The legs are swelling up, again, and PCP hesitant to give diuretic because of low bp.    From my note 14:  Is more cognizant of wearing off or late with medications.  Freezing of legs at time and cannot step back.    Feelings of near-faint has much improved.  Wife  6 weeks ago after an elective surgery.      From my note 13:  3-4 months ago had change in behavior and doing strange things like walking out of house only in diaper.  During PSG, 02 dropped to 84% and became agitated.  Since adjusting the CPAP settings (up from 10 to 15), he no longer has the odd behavior.  Dozes off during day and will awake confused, but nothing like he was 3-4 months ago.  Patient says he " "recalls the episode with the diaper- that he woke up with a start out of sleep, ran out of the house and when he was outside, wondered "what the heck" he was out there for.  In terms of PD, he feels he has progressed.  Fallen x 2, putting hole in sheetrock.  More difficulty walking, drooling increasing, urinary urgency x 6 months.  Low back pain for years, no difference in this.    II. Review of systems As in HPI, otherwise, balance 3 systems reviewed and are negative.   III. Past Medical history: PD ~2004, HTN, obesity, arthritis   Family history: Father with PD, paternal aunt with "essential tremor"   Social history: No tob or etoh, , disabled; two daughters.      Current medications: stalevo 200 BID   Allergies: Demerol, azilect (presyncope), mirapex (edema, weight)     IV. Physical Exam (focused)      Physical Exam  Constitutional: Well-developed, well-nourished, appears stated age  Eyes: No scleral icterus  ENT: Moist oral mucosa  Cardiovascular: No lower extremity edema   Respiratory: No labored breathing   Skin: No rash   Hematologic: No bruising    Other: GI/ deferred   Mental status: Drowsy (his BP is low), slumping in his chair  follows commands  Speech: normal (not dysarthric), no aphasia  Cranial nerves:            CN II: Pupils mid-position and equal, not tested light or accommodation  CN III, IV, VI: Extraocular movements full, no nystagmus visualized  CN V: Not tested   CN VII: Face strong and symmetric bilaterally   CN VIII: Hearing intact to voice and conversation   CN IX, X: Palate raises midline and symmetric   CN XI: Strong shoulder shrug B/L  CN XII: Tongue appears midline   Motor: Normal bulk by appearance, no drift   Sensory: Not tested    Gait: Not tested  Deep tendon reflexes: Not tested  Movement/Coordination                    Severe hypophonic speech.                     Severe facial masking.  R leading cervical dystonia                   No tremor with rest, posture, kinesis, " or intention.                  Bradykinesia  ? Finger taps Finger flicks REMA Heel taps   Left 4+ 4+     Right 3+ 3+         No visualized tremor      V. Laboratory/ Radiological Data: No new disease-specific data     VI. Medical Decision Making     Problem List Items Addressed This Visit          Neuro    Parkinson's disease without dyskinesia, with fluctuating manifestations    Overview     2004         Current Assessment & Plan     Longstanding PD with ongoing decline  Severe rigidity    Changed carbidopa/levodopa 25/100mg 1 tab PO 5x daily to 1.5 tabs 3x daily which helped dexterity  Under dosed due to hallucinations  Suggested PT OT speech           Memory change    Current Assessment & Plan     I'd like to start aricept 5mg QHS to decrease hallucinations and delusions- will ask cardiology for clearance            ENT    Drooling    Current Assessment & Plan     Can try atropine drops under tongue up to TID            Endocrine    Morbid obesity - Primary     Other Visit Diagnoses       Parkinsonism, unspecified Parkinsonism type                    Diana Mason MD, MS [facility]  Department of Neurology  Movement Disorders

## 2024-06-17 NOTE — ASSESSMENT & PLAN NOTE
I'd like to start aricept 5mg QHS to decrease hallucinations and delusions- will ask cardiology for clearance

## 2024-06-17 NOTE — Clinical Note
Can you send a message to Dr. Dillard at [facility] that I'd like to start donepezil - this cardiologist needs to clear it [phone]

## 2024-06-17 NOTE — ASSESSMENT & PLAN NOTE
Longstanding PD with ongoing decline  Severe rigidity    Changed carbidopa/levodopa 25/100mg 1 tab PO 5x daily to 1.5 tabs 3x daily which helped dexterity  Under dosed due to hallucinations  Suggested PT OT speech

## 2024-07-09 DIAGNOSIS — G20.A2 PARKINSON'S DISEASE WITHOUT DYSKINESIA, WITH FLUCTUATING MANIFESTATIONS: Primary | ICD-10-CM

## 2024-07-09 RX ORDER — CARBIDOPA AND LEVODOPA 25; 100 MG/1; MG/1
1.5 TABLET ORAL 3 TIMES DAILY
Qty: 135 TABLET | Refills: 11 | Status: SHIPPED | OUTPATIENT
Start: 2024-07-09 | End: 2025-07-09

## 2024-07-09 NOTE — TELEPHONE ENCOUNTER
----- Message from Aster James sent at 7/8/2024 11:00 AM CDT -----  Regarding: question  Contact: @ [phone]  Pt daughter called in regards to previous appointment per the doctor she was going to call in 2 medication but only one was called in they are looking for the Parkinson medicine  .....Please call and adv @ [phone]

## 2024-07-09 NOTE — TELEPHONE ENCOUNTER
Pt daughter called in regards to previous appointment per the doctor she was going to call in 2 medication but only one was called in they are looking for the Parkinson medicine .....Please call and adv @ [phone]     Called  daughter Kalee back to clarify what med she is looking for.  LOV : 6/17/24   She stated she wanted to get clearance from cardiology regarding the aricept.     Daughter Kalee is inquiring if he is cleared yet to start aricept 5 mg.   Pended rx for new cd/ld dosage.    Cardiologist is at [facility] and he has upcoming appointment 8/20/2024 with [facility] Dr. Nishant Monae MD  Encounter Providers Encounter Date   Andres Dillard MD (Attending)  NPI: [id number]  [phone] (Work)  [phone] (Fax)  [address]  3rd Floor    Routed to  to advise.

## 2024-07-15 ENCOUNTER — TELEPHONE (OUTPATIENT)
Dept: NEUROLOGY | Facility: CLINIC | Age: 74
End: 2024-07-15
Payer: MEDICARE

## 2024-07-15 NOTE — TELEPHONE ENCOUNTER
----- Message from Be Butler Jr., MA sent at 7/15/2024  2:27 PM CDT -----  Regarding: FW: Order Needed    ----- Message -----  From: Thony Avila  Sent: 7/12/2024  11:20 AM CDT  To: [facility] Staff  Subject: Order Needed                                     Hi, pt's daughter called to discuss the medication Dr. Casey has not sent in yet,. Pls call Kalee Cruz (Relative)  [phone] to discuss.  Thank you.

## 2024-07-15 NOTE — TELEPHONE ENCOUNTER
Called Daughter Kalee, who had sent previous message regarding medication, Ethan    Pt has not had a recent ECG.  Informed Kalee that an EKG needs to be done prior to prescribing. She reported :  He has a new cardiologist and will see in August.  Harrison will be cardiologist at [facility]    She stated she would check in after this occurs.

## 2024-07-16 ENCOUNTER — TELEPHONE (OUTPATIENT)
Dept: NEUROLOGY | Facility: CLINIC | Age: 74
End: 2024-07-16
Payer: MEDICARE

## 2024-07-16 NOTE — TELEPHONE ENCOUNTER
----- Message from Be Butler Jr., MA sent at 7/15/2024  4:25 PM CDT -----  Regarding: FW: refill  Contact: sushant Granda @[phone]    ----- Message -----  From: Mary Villagomez  Sent: 7/15/2024   2:00 PM CDT  To: [facility] Staff  Subject: refill                                           Caller stated there was a new medication that was supposed to be sent for pt in regards to his Parkinson's, aracept. But they keep calling to pick it up but it has never been called in. Caller stated she keeps calling and getting told that someone will call back and they don't. Caller stated if it is not being sent can someone please call and let them know. His appt was 3 weeks ago. Pls call to discuss. Caller asking to get a text if no one can call, can they at least get a text. Caller is concerned she is missing the call. And caller wants to make sure they are giving pt the medicine if they are supposed to.     ..  [facility] ([place] - [place] - [address]  [address] [place] [address]  Phone: [phone] Fax: [phone]

## 2024-09-12 ENCOUNTER — TELEPHONE (OUTPATIENT)
Dept: NEUROLOGY | Facility: CLINIC | Age: 74
End: 2024-09-12
Payer: MEDICARE

## 2024-09-12 NOTE — TELEPHONE ENCOUNTER
----- Message from Stefanie Hobbs sent at 9/9/2024 11:38 AM CDT -----  Name of Who is Calling:Kaele        What is the request in detail:Kalee will be sending over pt EKG report for starting new medication Please advise thank you       Can the clinic reply by [facility]:        What Number to Call Back if not in [facility]:Telephone Information:  [employer]          [phone]

## 2024-09-16 ENCOUNTER — TELEPHONE (OUTPATIENT)
Dept: NEUROLOGY | Facility: CLINIC | Age: 74
End: 2024-09-16
Payer: MEDICARE

## 2024-09-16 NOTE — TELEPHONE ENCOUNTER
Received message from pt that EKG was delivered. Unable to locate it and my calls were not returned.       Obtained results       Ref Range & Units 3 wk ago    VENTRICULAR RATE    QRS DURATION ms 90   Q-T INTERVAL ms 386   QTC CALCULATION(BEZET) ms 500   R AXIS degrees -1   T AXIS degrees 60   INTERPRETATION ([facility])  Atrial fibrillation with rapid ventricular response Nonspecific ST abnormality Confirmed by Nishant HERRERA, Andres ([id number]) on 9/14/2024 7:55:33 PM   Resulting Agency  [facility]   Specimen Collected: 08/20/24 14:30    Performed by: [facility] Last Resulted: 09/14/24 19:55   Received From: [facility]  Result Received: 09/16/24 09:33    View Encounter      Q-T interval 386. Unable to locate EKG strip. Informed Dr. Mason

## 2024-09-17 NOTE — TELEPHONE ENCOUNTER
Called Dr. Dillard's office and informed the  that Dr. Mason wishes to speak with Dr. Dillard regarding safely starting donepezil.  She took Dr. Mason's cell phone number and stated she would forward it to the nurse.

## 2024-10-02 ENCOUNTER — TELEPHONE (OUTPATIENT)
Dept: NEUROLOGY | Facility: CLINIC | Age: 74
End: 2024-10-02
Payer: MEDICARE

## 2024-10-02 NOTE — TELEPHONE ENCOUNTER
Called patient elective to let them know we have received the EKG from the Cardiologist office. Patient did not  left a message. Also letting them know the Doctor will review it for tomorrow when she gets back in office.

## 2024-10-02 NOTE — TELEPHONE ENCOUNTER
----- Message from Mary sent at 10/2/2024  9:49 AM CDT -----  Regarding: results  Contact: sushant Granda @[phone]  Provider requested an EKG from cardiologist. They have sent it multiple times, and she doesn't know why but they aren't receiving it. Well caller keeps calling asking if it's been received. Pls call to better discuss.

## 2024-10-03 ENCOUNTER — TELEPHONE (OUTPATIENT)
Dept: NEUROLOGY | Facility: CLINIC | Age: 74
End: 2024-10-03
Payer: MEDICARE

## 2024-10-03 NOTE — TELEPHONE ENCOUNTER
Called [facility] and spoke with call center. Informed individual that we have tried to reach the office of:    Andres Dillard MD   [address]   3rd Floor   [place] [address]   [phone] (Work)   [phone] (Fax)     To discuss EKG and medication appropriate for neurologist to prescribe. Left detailed message for staff to call Dr. Mason's cell phone to address the issue urgently.    Called pt's daughter to inform. Left message on cell phone

## 2024-10-10 DIAGNOSIS — R41.3 MEMORY CHANGE: Primary | ICD-10-CM

## 2024-10-10 NOTE — TELEPHONE ENCOUNTER
----- Message from Frantz sent at 10/9/2024  2:02 PM CDT -----  Regarding: Advice  Contact: [phone]  Who call ? Pt daughter Kalee      What is the request Details : Kalee calling to speak with someone in provider office regarding EKG. States she would like to know did Dr. Mason receive results and if so when will prescription for Arisett be sent to pharmacy.  Please call back .        Can clinic  use patient portal  : No     What number to call back : [phone]

## 2024-10-11 ENCOUNTER — PATIENT MESSAGE (OUTPATIENT)
Dept: NEUROLOGY | Facility: CLINIC | Age: 74
End: 2024-10-11
Payer: MEDICARE

## 2024-10-11 ENCOUNTER — TELEPHONE (OUTPATIENT)
Dept: NEUROLOGY | Facility: CLINIC | Age: 74
End: 2024-10-11
Payer: MEDICARE

## 2024-10-11 RX ORDER — DONEPEZIL HYDROCHLORIDE 5 MG/1
5 TABLET, FILM COATED ORAL NIGHTLY
Qty: 30 TABLET | Refills: 11 | Status: SHIPPED | OUTPATIENT
Start: 2024-10-11 | End: 2025-10-11

## 2024-10-11 NOTE — TELEPHONE ENCOUNTER
Pt's family is inquiring about receipt of EKG and increase in Aricept. EKG attached to chart in . Routed to Dr. Mason to advise.    Pended rx for donepezil 5 mg if approved.

## 2024-10-11 NOTE — TELEPHONE ENCOUNTER
Called Kalee to inform her the EKG has arrived and Dr Mason will review and prescribe if indicated.

## 2024-10-14 ENCOUNTER — TELEPHONE (OUTPATIENT)
Dept: NEUROLOGY | Facility: CLINIC | Age: 74
End: 2024-10-14
Payer: MEDICARE

## 2024-10-14 NOTE — TELEPHONE ENCOUNTER
Called and left message for Kalee with call back number to inform her we need a new EKG in order to evaluate for memory meds. Provided call back number.

## 2024-10-15 NOTE — TELEPHONE ENCOUNTER
Kalee returned call regarding EKG. She was informed that the EKG is abnormal and it isn't safe to take aricept.    Pt has been getting agitated and delusional, paranoid that he thinks the family is lying to him.     She reports he is really frail and has had Afib forever.    Lately, in the past year the confusion/ agitation/ panic are the biggest issues. If he's deep in a delusion that we're up to something, we'd like a prn for that.    Please advise.

## 2024-10-18 ENCOUNTER — DOCUMENTATION ONLY (OUTPATIENT)
Dept: NEUROLOGY | Facility: CLINIC | Age: 74
End: 2024-10-18
Payer: MEDICARE

## 2024-11-11 DIAGNOSIS — G20.A2 PARKINSON'S DISEASE WITHOUT DYSKINESIA, WITH FLUCTUATING MANIFESTATIONS: ICD-10-CM

## 2024-11-11 RX ORDER — CARBIDOPA AND LEVODOPA 25; 100 MG/1; MG/1
1.5 TABLET ORAL 3 TIMES DAILY
Qty: 135 TABLET | Refills: 0 | Status: SHIPPED | OUTPATIENT
Start: 2024-11-11 | End: 2025-11-11

## 2024-11-11 NOTE — TELEPHONE ENCOUNTER
----- Message from Ankush Lr sent at 11/11/2024  2:40 PM CST -----  Regarding: FW: refill  Contact: [phone]    ----- Message -----  From: Collette Petty  Sent: 11/11/2024  12:43 PM CST  To: Tash Ortiz Staff  Subject: refill                                           X Name:carbidopa-levodopa  mg (SINEMET)  mg per tablet     How is it taken:Sig - Route: Take 1.5 tablets by mouth 3 (three) times daily. - Oral     Quantity:135       Preferred Pharmacy with phone number:   [facility] ([place]) - [place] - [address]  [address] [place] [address]  Phone: [phone] Fax: [phone]            Ordering Provider: tash        Contact Preference:  [phone]     Addl info:

## 2024-12-11 DIAGNOSIS — G20.A2 PARKINSON'S DISEASE WITHOUT DYSKINESIA, WITH FLUCTUATING MANIFESTATIONS: ICD-10-CM

## 2024-12-11 RX ORDER — CARBIDOPA AND LEVODOPA 25; 100 MG/1; MG/1
1.5 TABLET ORAL 3 TIMES DAILY
Qty: 405 TABLET | Refills: 2 | Status: SHIPPED | OUTPATIENT
Start: 2024-12-11 | End: 2025-12-11

## 2024-12-11 NOTE — TELEPHONE ENCOUNTER
Received renewal request for carbidopa levodopa  mg 1.5 tabs tid. Pended 90 day supply.  LOV 6/17/2024

## 2024-12-11 NOTE — TELEPHONE ENCOUNTER
----- Message from Med Assistant Lr sent at 12/9/2024  1:52 PM CST -----  Regarding: FW: refill  Contact: [phone]    ----- Message -----  From: Mary Villagomez  Sent: 12/9/2024  12:22 PM CST  To: Isabella Oritz Staff  Subject: refill                                           ..Type:  RX Refill Request    Who Called: Kalee, daughter   Refill or New Rx:refill   RX Name and Strength:carbidopa-levodopa  mg (SINEMET)  mg per tablet  How is the patient currently taking it? (ex. 1XDay): Take 1.5 tablets by mouth 3 (three) times daily  Is this a 30 day or 90 day RX:not sure , thinks 90  Preferred Pharmacy with phone number:..  [facility] ([place]) - [place] - [address]  [address] [place] [address]  Phone: [phone] Fax: [phone]    Local or Mail Order:local  Ordering Provider:Rebeka but caller asked to have it sent to Isabella  Would the patient rather a call back or a response via [facility]? Call back   Best Call Back Number:[phone]  Additional Information: n/a

## 2024-12-12 ENCOUNTER — TELEPHONE (OUTPATIENT)
Dept: NEUROLOGY | Facility: CLINIC | Age: 74
End: 2024-12-12
Payer: MEDICARE

## 2024-12-12 NOTE — TELEPHONE ENCOUNTER
----- Message from Ankush Lr sent at 12/11/2024  1:08 PM CST -----    ----- Message -----  From: Arleth Bravo  Sent: 12/11/2024  12:28 PM CST  To: [facility]abella Ortiz Staff    Type:  RX Refill Request    Who Called: Amish   Refill or New Rx:refill  RX Name and Strength:carbidopa-levodopa  mg (SINEMET)  mg per tablet  How is the patient currently taking it? (ex. 1XDay):  Is this a 30 day or 90 day RX:  Preferred Pharmacy with phone number:[facility] ([place] [place] - [address]   Phone: [phone]  Fax: [phone]        Local or Mail Order:local  Ordering Provider:Dr Krishna   Would the patient rather a call back or a response via [facility]? call  Best Call Back Number:[phone]  Additional Information: Patient is currently out as of tomorrow

## 2025-04-14 DIAGNOSIS — G20.A2 PARKINSON'S DISEASE WITHOUT DYSKINESIA, WITH FLUCTUATING MANIFESTATIONS: ICD-10-CM

## 2025-04-14 RX ORDER — CARBIDOPA AND LEVODOPA 25; 100 MG/1; MG/1
1.5 TABLET ORAL 3 TIMES DAILY
Qty: 405 TABLET | Refills: 0 | Status: SHIPPED | OUTPATIENT
Start: 2025-04-14 | End: 2026-04-14

## 2025-04-14 NOTE — TELEPHONE ENCOUNTER
----- Message from Aster sent at 4/14/2025  8:16 AM CDT -----  Refill request. Pt doesn't have enough to make through the day carbidopa-levodopa  mg (SINEMET)  [facility] [place] [address] [place] [address]T[address]gz: [phone] Fax: [phone] patient's contact info below:Contact Name: Amish CruzPhone Number: [phone]

## 2025-04-21 DIAGNOSIS — G20.A2 PARKINSON'S DISEASE WITHOUT DYSKINESIA, WITH FLUCTUATING MANIFESTATIONS: ICD-10-CM

## 2025-04-21 NOTE — TELEPHONE ENCOUNTER
----- Message from Med Assistant Salma sent at 4/21/2025  2:22 PM CDT -----  Regarding: FW: New RX  Contact: [phone]    ----- Message -----  From: Margarita Bernal  Sent: 4/21/2025  10:52 AM CDT  To: [facility] Staff  Subject: New RX                                           Is this a Refill or New Rx (Script/Out of Refills):  New RXName of Caller: Patient's daughter Malick Name: carbidopa-levodopa  mg (SINEMET)  mg per tabletPharmacy Name: [facility] ([place]) - [place] [address] [place] [address]Onemu: [phone] Fax: [phone] Information: Pt's daughter is stating that medication is running out before due date. She states provider needs to send over a new script to pharmacy with directions: 2 tablets 4 times a day in order for patient to not keep running out of medication. Kalee states provider is aware that sometimes pt take 2 pill 4 times a day instead of 1.5 pills four times a day.

## 2025-04-21 NOTE — TELEPHONE ENCOUNTER
From 6/24 office visit-   Changed carbidopa/levodopa 25/100mg 1 tab PO 5x daily to 1.5 tabs 3x daily which helped dexterity     Kalee states that patient routinely 2 tabs 4 tabs a day. Denies that patient is having hallucinations or agitation.

## 2025-04-22 RX ORDER — CARBIDOPA AND LEVODOPA 25; 100 MG/1; MG/1
2 TABLET ORAL 4 TIMES DAILY
Qty: 240 TABLET | Refills: 0 | Status: SHIPPED | OUTPATIENT
Start: 2025-04-22 | End: 2026-04-22

## 2025-04-25 ENCOUNTER — TELEPHONE (OUTPATIENT)
Dept: NEUROLOGY | Facility: CLINIC | Age: 75
End: 2025-04-25
Payer: MEDICARE

## 2025-05-16 ENCOUNTER — PATIENT MESSAGE (OUTPATIENT)
Facility: CLINIC | Age: 75
End: 2025-05-16
Payer: MEDICARE

## 2025-05-19 ENCOUNTER — TELEPHONE (OUTPATIENT)
Facility: CLINIC | Age: 75
End: 2025-05-19
Payer: MEDICARE

## 2025-05-19 ENCOUNTER — TELEPHONE (OUTPATIENT)
Facility: CLINIC | Age: 75
End: 2025-05-19

## 2025-05-19 ENCOUNTER — OFFICE VISIT (OUTPATIENT)
Facility: CLINIC | Age: 75
End: 2025-05-19
Payer: MEDICARE

## 2025-05-19 ENCOUNTER — PATIENT MESSAGE (OUTPATIENT)
Facility: CLINIC | Age: 75
End: 2025-05-19

## 2025-05-19 DIAGNOSIS — G20.A2 PARKINSON'S DISEASE WITHOUT DYSKINESIA, WITH FLUCTUATING MANIFESTATIONS: ICD-10-CM

## 2025-05-19 DIAGNOSIS — R41.3 MEMORY CHANGE: ICD-10-CM

## 2025-05-19 PROCEDURE — 98005 SYNCH AUDIO-VIDEO EST LOW 20: CPT | Mod: 95,,, | Performed by: PSYCHIATRY & NEUROLOGY

## 2025-05-19 NOTE — TELEPHONE ENCOUNTER
Called patient relative to schedule labs ordered by Dr. Casey. Patient relative states she will call me back with a proper date and time for the lab appt to be scheduled.

## 2025-05-19 NOTE — TELEPHONE ENCOUNTER
Called patient to schedule labs ordered by Dr. Mason. left a voice messge with my name, reason for calling,and neuro clinic's call back number.

## 2025-05-19 NOTE — TELEPHONE ENCOUNTER
Called Dr Terry's office requesting a call back to Dr. Mason's cell phone for Aricept clearance. Left message with

## 2025-05-19 NOTE — TELEPHONE ENCOUNTER
Attempted to reach patient relative to schedule the labs ordered by Dr. Mason today. i left a message with my name, reason for calling, and Neuro clinic's call back number. Patient's relative previously stated they'd call back during our previous call. I have not received a call back.

## 2025-05-19 NOTE — TELEPHONE ENCOUNTER
Called patient relative to schedule 6 month virtual visit follow up with Dr. Mason. Patient relative confirmed appt time and date.

## 2025-05-19 NOTE — Clinical Note
Please have this cardiologist call me re: aricept clearance Harrison Arileonardo clearence [phone]

## 2025-05-19 NOTE — TELEPHONE ENCOUNTER
----- Message from Diana Mason MD sent at 5/19/2025 10:23 AM CDT -----  Please have this cardiologist call me re: aricept clearance  Harrison  Arileonardo clearence  [phone]

## 2025-05-19 NOTE — PROGRESS NOTES
"The patient location is: HOME  The chief complaint leading to visit is: iPD  1. Memory change  Vitamin B1    Vitamin B12 Deficiency Panel    Neurofilament Light Chain    pTau-181, Plasma    Ambulatory consult to Neuropsychology    Encephalopathy Autoimmune Eval      2. Parkinson's disease without dyskinesia, with fluctuating manifestations            Visit type: Virtual visit with synchronous audio and video  Total time spent with patient: 20  Each patient to whom he or she provides medical services by telemedicine is:  (1) informed of the relationship between the physician and patient and the respective role of any other health care provider with respect to management of the patient; and (2) notified that he or she may decline to receive medical services by telemedicine and may withdraw from such care at any time.    I. Chief Complaints during this visit:     Interval Hx    Since last visit,   Pt with Afib, Longstanding PD since 15 years.  Fully dependent - wheelchair bound and hospital bed    No dysphagia    Confusions and hallucinations ongoing  At times does not accept hallucinations as not real  Has not yet started donepezil 5mg QHS - awaiting clearance from cardiology OSH  At times has periods of lucidity  Other times he has an internal dialogue and talks like a "little boy"    Since last visit,   Taking carbidopa/levodopa 25/100mg 1.5 tab PO BID to TID  AM dose 2 tabs  Better mobility  Having on and off hallucinations, delusions, labile moods  Very britte - too much or too little cause hallucinations  Daughter has found sleep hygiene has helped hallucinations    BP fluctuates highly  Wheelchair bound and sleeps in a recliner  Has a suprapubic catheter and this prevents abdominal binder use  Wears compression stockings on and off    At times confused in the AM    Prior  midodrine 2.5mg PO BID prior  When he was on midodrine 5mg PO BID his blood pressure spiked to 220/180    Allergies: Demerol, azilect " "(presyncope), mirapex (edema, weight)   ________________________________________    History of present illness:   74 y.o. M seen in routine f/u for PD.  Accompanied by daughter.    Excessive drooling is becoming more of an issue and both he and daughter interested in trying botox for this.  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    17  Has been doing well.  Walking in house- just uses wheelchair outside home.  If he takes too much stalevo, he gets cramping in legs, hallucinations.  In general, he seems to do best on twice a day stalevo.  Drooling, but says it's not a big concern for him.    17  Not seen in four years.  Checked in 20min late.  Accompanied by daughter.  Says he had basically been ok, had trouble getting appointment.  Pain medication for muscle spasm caused significant problems of hallucinations.  Daughter says he was also taking more PD meds because family was more involved as well.  So they cut back on PD meds and his hallucinations improved, but they have had increase back up in last few months because he was freezing often.  No recurrence of hallucinations.  Has been living with daughter for past 5 years.  Can do all his own ADLs.  Occasionally confused at night.  Recent home health.    Interval history 11/3/15:  Accompanied by daughter.  Increase in stalevo helped his gait, but not his FOG and perhaps made him more tired.    From my note 8/3/15:  Now his 3 yo grand-daughter is living with them, though this has been a good thing for them.  Walking is "lowsy".  Freezing often.  No falls.  The legs are swelling up, again, and PCP hesitant to give diuretic because of low bp.    From my note 14:  Is more cognizant of wearing off or late with medications.  Freezing of legs at time and cannot step back.    Feelings of near-faint has much improved.  Wife  6 weeks ago after an elective surgery.      From my note 13:  3-4 months ago had " "change in behavior and doing strange things like walking out of house only in diaper.  During PSG, 02 dropped to 84% and became agitated.  Since adjusting the CPAP settings (up from 10 to 15), he no longer has the odd behavior.  Dozes off during day and will awake confused, but nothing like he was 3-4 months ago.  Patient says he recalls the episode with the diaper- that he woke up with a start out of sleep, ran out of the house and when he was outside, wondered "what the heck" he was out there for.  In terms of PD, he feels he has progressed.  Fallen x 2, putting hole in sheetrock.  More difficulty walking, drooling increasing, urinary urgency x 6 months.  Low back pain for years, no difference in this.    II. Review of systems As in HPI, otherwise, balance 3 systems reviewed and are negative.   III. Past Medical history: PD ~2004, HTN, obesity, arthritis   Family history: Father with PD, paternal aunt with "essential tremor"   Social history: No tob or etoh, , disabled; two daughters.      Current medications: stalevo 200 BID   Allergies: Demerol, azilect (presyncope), mirapex (edema, weight)     IV. Physical Exam (focused)      Physical Exam  Constitutional: Well-developed, well-nourished, appears stated age  Eyes: No scleral icterus  ENT: Moist oral mucosa  Cardiovascular: No lower extremity edema   Respiratory: No labored breathing   Skin: No rash   Hematologic: No bruising    Other: GI/ deferred   Mental status: Drowsy (his BP is low), slumping in his chair  follows commands  Speech: normal (not dysarthric), no aphasia  Cranial nerves:            CN II: Pupils mid-position and equal, not tested light or accommodation  CN III, IV, VI: Extraocular movements full, no nystagmus visualized  CN V: Not tested   CN VII: Face strong and symmetric bilaterally   CN VIII: Hearing intact to voice and conversation   CN IX, X: Palate raises midline and symmetric   CN XI: Strong shoulder shrug B/L  CN XII: Tongue " appears midline   Motor: Normal bulk by appearance, no drift   Sensory: Not tested    Gait: Not tested  Deep tendon reflexes: Not tested  Movement/Coordination                    Severe hypophonic speech.                     Severe facial masking.  R leading cervical dystonia                   No tremor with rest, posture, kinesis, or intention.                  Bradykinesia  ? Finger taps Finger flicks REMA Heel taps   Left 4+ 4+     Right 3+ 3+         No visualized tremor      V. Laboratory/ Radiological Data: No new disease-specific data     VI. Medical Decision Making     Problem List Items Addressed This Visit          Neuro    Parkinson's disease without dyskinesia, with fluctuating manifestations    Overview   2004         Current Assessment & Plan   Longstanding PD with ongoing decline  Severe rigidity    Changed carbidopa/levodopa 25/100mg 1 tab PO 5x daily to 1.5 tabs 3x daily which helped dexterity    Under dosed due to hallucinations  Suggested PT OT speech         Memory change    Current Assessment & Plan   Ongoing memory decline  F/u neuopsych testing  F/u reversible labs  Fluctuating mentation suggestive of LBD  Try Bacopa supplment 500mg Qdaily  Will reach out again to  Cardiologist Denis Dinh clearence  [phone]           Relevant Orders    Vitamin B1    Vitamin B12 Deficiency Panel    Neurofilament Light Chain    pTau-181, Plasma    Ambulatory consult to Neuropsychology    Encephalopathy Autoimmune Eval             Diana Mason MD, MS  [facility]  Department of Neurology  Movement Disorders

## 2025-05-19 NOTE — ASSESSMENT & PLAN NOTE
Ongoing memory decline  F/u neuopsych testing  F/u reversible labs  Fluctuating mentation suggestive of LBD  Try Bacopa supplment 500mg Qdaily  Will reach out again to  Cardiologist -Harrison Dinh clearence  [phone]

## 2025-05-21 ENCOUNTER — DOCUMENTATION ONLY (OUTPATIENT)
Facility: CLINIC | Age: 75
End: 2025-05-21
Payer: MEDICARE

## 2025-05-21 RX ORDER — DONEPEZIL HYDROCHLORIDE 5 MG/1
5 TABLET, FILM COATED ORAL NIGHTLY
Qty: 30 TABLET | Refills: 11 | Status: SHIPPED | OUTPATIENT
Start: 2025-05-21 | End: 2026-05-21

## 2025-05-21 NOTE — PROGRESS NOTES
Spoke with Cardiology team Dr. Terry's office who approved starting donepezil 5mg  Pt to do EKG in 2 weeks with cardiology team

## 2025-06-06 ENCOUNTER — TELEPHONE (OUTPATIENT)
Facility: CLINIC | Age: 75
End: 2025-06-06
Payer: MEDICARE

## 2025-06-11 ENCOUNTER — LAB VISIT (OUTPATIENT)
Dept: LAB | Facility: HOSPITAL | Age: 75
End: 2025-06-11
Attending: PSYCHIATRY & NEUROLOGY
Payer: MEDICARE

## 2025-06-11 DIAGNOSIS — R41.3 MEMORY CHANGE: ICD-10-CM

## 2025-06-11 PROCEDURE — 84425 ASSAY OF VITAMIN B-1: CPT

## 2025-06-11 PROCEDURE — 83921 ORGANIC ACID SINGLE QUANT: CPT

## 2025-06-11 PROCEDURE — 83520 IMMUNOASSAY QUANT NOS NONAB: CPT

## 2025-06-11 PROCEDURE — 36415 COLL VENOUS BLD VENIPUNCTURE: CPT

## 2025-06-13 LAB
NEUROFILAMENT LIGHT CHAIN, PLASMA: 385 PG/ML
VIT B12 SERPL-MCNC: 207 NG/L (ref 180–914)

## 2025-06-16 LAB — LC PHOSPHO-TAU (181P): 4.28 PG/ML (ref 0–0.97)

## 2025-06-17 LAB
METHYLMALONATE SERPL-SCNC: 0.4 NMOL/ML
W VITAMIN B1: 48 UG/L

## 2025-06-27 ENCOUNTER — DOCUMENTATION ONLY (OUTPATIENT)
Facility: CLINIC | Age: 75
End: 2025-06-27
Payer: MEDICARE

## 2025-06-27 DIAGNOSIS — R41.3 MEMORY CHANGE: Primary | ICD-10-CM

## 2025-06-30 ENCOUNTER — DOCUMENTATION ONLY (OUTPATIENT)
Facility: CLINIC | Age: 75
End: 2025-06-30
Payer: MEDICARE

## 2025-06-30 ENCOUNTER — TELEPHONE (OUTPATIENT)
Facility: CLINIC | Age: 75
End: 2025-06-30
Payer: MEDICARE

## 2025-06-30 NOTE — TELEPHONE ENCOUNTER
----- Message from Diana Mason MD sent at 6/27/2025  5:04 PM CDT -----  Regarding: bloodwork  Can you ask him to visit his closest [facility] for some bloodwork next week?

## 2025-06-30 NOTE — TELEPHONE ENCOUNTER
Left detailed message for patient to please have lab drawn at his convenience. Appt scheduled for tomorrow at 11:10 at [facility].

## 2025-07-25 ENCOUNTER — PATIENT MESSAGE (OUTPATIENT)
Facility: CLINIC | Age: 75
End: 2025-07-25
Payer: MEDICARE

## 2025-09-05 DIAGNOSIS — G20.A2 PARKINSON'S DISEASE WITHOUT DYSKINESIA, WITH FLUCTUATING MANIFESTATIONS: ICD-10-CM

## 2025-09-05 RX ORDER — CARBIDOPA AND LEVODOPA 25; 100 MG/1; MG/1
1.5 TABLET ORAL 3 TIMES DAILY
Qty: 240 TABLET | Refills: 0 | Status: SHIPPED | OUTPATIENT
Start: 2025-09-05 | End: 2026-09-05